# Patient Record
Sex: FEMALE | Race: BLACK OR AFRICAN AMERICAN | NOT HISPANIC OR LATINO | Employment: FULL TIME | URBAN - METROPOLITAN AREA
[De-identification: names, ages, dates, MRNs, and addresses within clinical notes are randomized per-mention and may not be internally consistent; named-entity substitution may affect disease eponyms.]

---

## 2017-06-29 ENCOUNTER — HISTORICAL (OUTPATIENT)
Dept: ADMINISTRATIVE | Facility: HOSPITAL | Age: 47
End: 2017-06-29

## 2018-08-08 ENCOUNTER — HISTORICAL (OUTPATIENT)
Dept: ADMINISTRATIVE | Facility: HOSPITAL | Age: 48
End: 2018-08-08

## 2018-08-08 LAB
ABS NEUT (OLG): 3.49 X10(3)/MCL (ref 2.1–9.2)
ALBUMIN SERPL-MCNC: 3.8 GM/DL (ref 3.4–5)
ALBUMIN/GLOB SERPL: 1 RATIO (ref 1–2)
ALP SERPL-CCNC: 81 UNIT/L (ref 45–117)
ALT SERPL-CCNC: 21 UNIT/L (ref 12–78)
APPEARANCE, UA: NORMAL
AST SERPL-CCNC: 16 UNIT/L (ref 15–37)
BACTERIA #/AREA URNS AUTO: ABNORMAL /[HPF]
BASOPHILS # BLD AUTO: 0.04 X10(3)/MCL
BASOPHILS NFR BLD AUTO: 1 %
BILIRUB SERPL-MCNC: 0.2 MG/DL (ref 0.2–1)
BILIRUB UR QL STRIP: NEGATIVE
BILIRUBIN DIRECT+TOT PNL SERPL-MCNC: <0.1 MG/DL
BILIRUBIN DIRECT+TOT PNL SERPL-MCNC: ABNORMAL MG/DL
BUN SERPL-MCNC: 9 MG/DL (ref 7–18)
CALCIUM SERPL-MCNC: 8.4 MG/DL (ref 8.5–10.1)
CHLORIDE SERPL-SCNC: 108 MMOL/L (ref 98–107)
CHOLEST SERPL-MCNC: 157 MG/DL
CHOLEST/HDLC SERPL: 3.6 {RATIO} (ref 0–4.4)
CO2 SERPL-SCNC: 27 MMOL/L (ref 21–32)
COLOR UR: NORMAL
CREAT SERPL-MCNC: 0.8 MG/DL (ref 0.6–1.3)
EOSINOPHIL # BLD AUTO: 0.1 10*3/UL
EOSINOPHIL NFR BLD AUTO: 1 %
ERYTHROCYTE [DISTWIDTH] IN BLOOD BY AUTOMATED COUNT: 13.9 % (ref 11.5–14.5)
EST. AVERAGE GLUCOSE BLD GHB EST-MCNC: 117 MG/DL
GLOBULIN SER-MCNC: 4.3 GM/ML (ref 2.3–3.5)
GLUCOSE (UA): NORMAL
GLUCOSE SERPL-MCNC: 89 MG/DL (ref 74–106)
HBA1C MFR BLD: 5.7 % (ref 4.2–6.3)
HCT VFR BLD AUTO: 41 % (ref 35–46)
HDLC SERPL-MCNC: 44 MG/DL
HGB BLD-MCNC: 13.3 GM/DL (ref 12–16)
HGB UR QL STRIP: NEGATIVE
HYALINE CASTS #/AREA URNS LPF: ABNORMAL /[LPF]
IMM GRANULOCYTES # BLD AUTO: 0.01 10*3/UL
IMM GRANULOCYTES NFR BLD AUTO: 0 %
KETONES UR QL STRIP: NEGATIVE
LDLC SERPL CALC-MCNC: 90 MG/DL (ref 0–130)
LEUKOCYTE ESTERASE UR QL STRIP: NEGATIVE
LYMPHOCYTES # BLD AUTO: 3.17 X10(3)/MCL
LYMPHOCYTES NFR BLD AUTO: 44 % (ref 13–40)
MCH RBC QN AUTO: 28.1 PG (ref 26–34)
MCHC RBC AUTO-ENTMCNC: 32.4 GM/DL (ref 31–37)
MCV RBC AUTO: 86.5 FL (ref 80–100)
MONOCYTES # BLD AUTO: 0.44 X10(3)/MCL
MONOCYTES NFR BLD AUTO: 6 % (ref 4–12)
NEUTROPHILS # BLD AUTO: 3.49 X10(3)/MCL
NEUTROPHILS NFR BLD AUTO: 48 X10(3)/MCL
NITRITE UR QL STRIP: NEGATIVE
PH UR STRIP: 8 [PH] (ref 4.5–8)
PLATELET # BLD AUTO: 302 X10(3)/MCL (ref 130–400)
PMV BLD AUTO: 10.2 FL (ref 7.4–10.4)
POTASSIUM SERPL-SCNC: 3.7 MMOL/L (ref 3.5–5.1)
PROT SERPL-MCNC: 8.1 GM/DL (ref 6.4–8.2)
PROT UR QL STRIP: NEGATIVE
RBC # BLD AUTO: 4.74 X10(6)/MCL (ref 4–5.2)
RBC #/AREA URNS AUTO: ABNORMAL /[HPF]
SODIUM SERPL-SCNC: 142 MMOL/L (ref 136–145)
SP GR UR STRIP: 1.01 (ref 1–1.03)
SQUAMOUS #/AREA URNS LPF: ABNORMAL /[LPF]
TRIGL SERPL-MCNC: 117 MG/DL
UROBILINOGEN UR STRIP-ACNC: NORMAL
VLDLC SERPL CALC-MCNC: 23 MG/DL
WBC # SPEC AUTO: 7.2 X10(3)/MCL (ref 4.5–11)
WBC #/AREA URNS AUTO: ABNORMAL /HPF

## 2019-02-15 ENCOUNTER — HISTORICAL (OUTPATIENT)
Dept: ADMINISTRATIVE | Facility: HOSPITAL | Age: 49
End: 2019-02-15

## 2019-08-15 ENCOUNTER — HISTORICAL (OUTPATIENT)
Dept: INTERNAL MEDICINE | Facility: CLINIC | Age: 49
End: 2019-08-15

## 2020-10-30 ENCOUNTER — HISTORICAL (OUTPATIENT)
Dept: ADMINISTRATIVE | Facility: HOSPITAL | Age: 50
End: 2020-10-30

## 2020-12-02 ENCOUNTER — HISTORICAL (OUTPATIENT)
Dept: ADMINISTRATIVE | Facility: HOSPITAL | Age: 50
End: 2020-12-02

## 2021-05-26 ENCOUNTER — HISTORICAL (OUTPATIENT)
Dept: INTERNAL MEDICINE | Facility: CLINIC | Age: 51
End: 2021-05-26

## 2021-06-10 ENCOUNTER — HISTORICAL (OUTPATIENT)
Dept: INTERNAL MEDICINE | Facility: CLINIC | Age: 51
End: 2021-06-10

## 2021-09-28 ENCOUNTER — HISTORICAL (OUTPATIENT)
Dept: ADMINISTRATIVE | Facility: HOSPITAL | Age: 51
End: 2021-09-28

## 2021-09-28 LAB
HBV SURFACE AG SERPL QL IA: NONREACTIVE
HCV AB SERPL QL IA: NONREACTIVE
HIV 1+2 AB+HIV1 P24 AG SERPL QL IA: NONREACTIVE
T PALLIDUM AB SER QL: NONREACTIVE

## 2021-11-08 ENCOUNTER — HISTORICAL (OUTPATIENT)
Dept: ADMINISTRATIVE | Facility: HOSPITAL | Age: 51
End: 2021-11-08

## 2021-11-08 LAB
BUN SERPL-MCNC: 10.6 MG/DL (ref 9.8–20.1)
CALCIUM SERPL-MCNC: 10.1 MG/DL (ref 8.7–10.5)
CHLORIDE SERPL-SCNC: 103 MMOL/L (ref 98–107)
CO2 SERPL-SCNC: 30 MMOL/L (ref 22–29)
CREAT SERPL-MCNC: 0.87 MG/DL (ref 0.55–1.02)
CREAT/UREA NIT SERPL: 12
DEPRECATED CALCIDIOL+CALCIFEROL SERPL-MC: 46.2 NG/ML (ref 30–80)
EST CREAT CLEARANCE SER (OHS): 71.2 ML/MIN
GLUCOSE SERPL-MCNC: 100 MG/DL (ref 74–100)
POTASSIUM SERPL-SCNC: 3.2 MMOL/L (ref 3.5–5.1)
PTH-INTACT SERPL-MCNC: 45.5 PG/ML (ref 8.7–77)
SODIUM SERPL-SCNC: 142 MMOL/L (ref 136–145)

## 2021-12-03 ENCOUNTER — HISTORICAL (OUTPATIENT)
Dept: ADMINISTRATIVE | Facility: HOSPITAL | Age: 51
End: 2021-12-03

## 2022-04-07 ENCOUNTER — HISTORICAL (OUTPATIENT)
Dept: ADMINISTRATIVE | Facility: HOSPITAL | Age: 52
End: 2022-04-07
Payer: MEDICAID

## 2022-04-23 VITALS
SYSTOLIC BLOOD PRESSURE: 125 MMHG | DIASTOLIC BLOOD PRESSURE: 75 MMHG | OXYGEN SATURATION: 100 % | WEIGHT: 239.63 LBS | BODY MASS INDEX: 38.51 KG/M2 | HEIGHT: 66 IN

## 2022-04-29 DIAGNOSIS — N18.9 CHRONIC KIDNEY DISEASE, UNSPECIFIED CKD STAGE: Primary | ICD-10-CM

## 2022-05-01 NOTE — HISTORICAL [facility] CERNER
This is a historical note converted from Cleo. Formatting and pictures may have been removed.  Please reference Cleo for original formatting and attached multimedia. Chief Complaint  follow up , c/o alethea calf/ankle soreness since walking time one month off/on  History of Present Illness  51-year-old -American female with a past medical history of hypertension, hypokalemia and?hypovitaminosis D who presents to clinic for follow-up visit. ?Patient has been doing well and having?only a few issues. ?Patient has been having calf cramps x1 month?that last 1-2 minutes in the morning?they go away?without?intervention.? She has not been having any?pain with walking. ?Nor?has she noted?any?numbness/tingling in her feet or?suddenly cold feet.? Patient has also been taking Estroven?that she received from [facility] recommendations?for her hot flashes.? Otherwise she has been doing well and having no issues.? Patient denies any recent fevers, chills, night sweats, chest pain, illnesses, shortness of breath, dizziness, headache no nausea, vomiting or urinary/bowel issues.  ?   Calf pain for about a month  Hot flashes - taking Estroven; based on [facility] pharmacy recommendations. It works.  Advised to discontinue?meloxicam?secondary to renal issues  ?   -Son at [place]  Review of Systems  14 point review of systems negative except for HPI  Physical Exam  Vitals & Measurements  T:?36.8? ?C (Oral)? HR:?89(Peripheral)? RR:?18? BP:?125/75?  HT:?168.00?cm? WT:?108.700?kg? BMI:?38.51? LMP:?01/01/2021 00:00 CST?  General:?well-developed well-nourished in no acute distress  Eye: PERRLA, EOMI, clear conjunctiva, eyelids normal  HENT:?Head-normocephalic?and atraumatic  Neck: full range of motion, no thyromegaly or lymphadenopathy, trachea midline, supple, no palpable thyroid nodules  Respiratory:?clear to auscultation bilaterally?without wheezes, rales, rhonchi  Cardiovascular:?regular rate and NSR without murmurs.  Nondisplaced?PMI. ?No gallops or rubs?no JVD. ?Capillary refill within normal limits. Stemmer sign negative.  Gastrointestinal:?soft, non-tender, non-distended with normal bowel sounds, without masses to palpation  Genitourinary: no CVA tenderness to palpation  Musculoskeletal:?full range of motion of all extremities/spine without limitation or discomfort  Integumentary: no rashes or skin lesions present  Neurologic: no signs of peripheral neurological deficit, motor/sensory function intact  Psychiatric: ?alert and oriented, cognitive function intact, cooperative with exam, good eye contact, judgement and insight intact, mood and affect full range.  Assessment/Plan  Hypertension  Hypokalemia  -Well-controlled;?BP?  -Patient stated that she was previously on lisinopril which gave her a terrible headache; will avoid  -Continue Amlodipine 10mg daily and HCTZ-Triamterene 25-37.5 daily and self-prescribed apple-cider vinegar gummies  -Continue Potasium chloride 10mEq daily  -We will follow-up with?BP check in 2 weeks  ?   CKD stage 1  -Taking meloxicam chronically; asked to discontinue all NSAIDs  -Continue HTN regimen  -Will monitor  ?   Calf cramps  -Likely related to hypokalemia  -Ordered BMP today; will increase K supplement as needed  ?   Hypovitaminosis D  -Patient states that she has been taking her medication?and is doing well  -Vitamin D level within normal limits  -Continue cholecalciferol?daily  ?   Health Maintenance/ Wellness  Pneumococcal vaccine: Not applicable at this time  TDAP: Up-to-date (August 2018)  Influenza vaccine: Flu vaccine (Guillbeaus 2021)  Zoster/Shingrix vaccine:?Not applicable at this time  Screening colonoscopy:?FOBT negative; will order?colonoscopy/FOBT?at next visit  Pap smear: negative for malignancy; Gyn visit 9/28/21  Mammogram: No mammographic evidence of malignancy in either breast (12/2020); repeat in 1 to 2 years depending on patient choice (Radiology vs ACP guidelines)  ?    Counselling:  - Patient instructed to limit alcohol use  - Patient counselled on smoking cessation  - Relevant educational materials provided  ?   Labs ordered:?CMP, CBC, PTH  Imaging:?Transthoracic echocardiogram  Medications: reconciled, discussed and refills given.  RTC in?6 months   Problem List/Past Medical History  Ongoing  Muscle spasm of cervical muscle of neck  Obesity  Historical  HTN (hypertension)  Pregnant  Pregnant  Pregnant  Pregnant  Pregnant  Pregnant  Procedure/Surgical History  Tubal ligation (2000)   Medications  amLODIPine 10 mg oral tablet, 10 mg= 1 tab(s), Oral, Daily, 11 refills  baclofen 10 mg oral tablet, See Instructions  D 1000 intl units oral tablet, See Instructions  Flublok Quadrivalent [id number] intramuscular solution, 0.5 mL, IM, Once,? ?Not Taking, Completed Rx  hydrochlorothiazide-triamterene 25 mg-37.5 mg oral capsule, See Instructions, 11 refills  meloxicam 15 mg oral tablet, 15 mg= 1 tab(s), Oral, Daily, PRN, 3 refills,? ?Not Taking, Completed Rx  potassium chloride 10 mEq oral CAPSULE extended release, 10 mEq= 1 cap(s), Oral, Daily, 11 refills  Shingrix intramuscular injection, 0.5 mL, IM, Once,? ?Not Taking, Completed Rx  Allergies  No Known Allergies  Social History  Abuse/Neglect  No, No, Yes, 09/17/2019  Alcohol - Denies Alcohol Use, 06/30/2014  Never, 02/04/2019  Employment/School  Employed, Activity level: Occasional physical work. Highest education level: High school. Operates hazardous equipment: No. Workplace hazards: Heavy lifting/twisting, Loud noises., 02/28/2015  Exercise  Exercise type: Walking, jump rope., 09/17/2019  Home/Environment  Living situation: Home/Independent. Alcohol abuse in household: No. Substance abuse in household: No. Smoker in household: No. Injuries/Abuse/Neglect in household: No. Feels unsafe at home: Yes. Safe place to go: Yes. Family/Friends available for support: Yes. Concern for family members at home: No. Major illness in household: No.  Financial concerns: No., 02/28/2015  Nutrition/Health  Regular, Good, 09/17/2019  Other  Sexual  Sexually active: Yes. Sexually active at age 15 Years. Number of current partners 1. Number of lifetime partners 4. Sexual orientation: Heterosexual. Uses condoms: Yes. History of sexual abuse: No., 02/28/2015  Spiritual/Cultural  Taoist, 01/07/2020  Substance Use - Denies Substance Abuse, 06/30/2014  Never, 02/04/2019  Tobacco - Denies Tobacco Use, 06/30/2014  Never (less than 100 in lifetime), N/A, 09/15/2020  Family History  Cancer - unknown origin: Father.  Hypertension.: Mother.  Stroke: Mother.  Stroke: Sister.  Immunizations  Vaccine Date Status Comments   influenza virus vaccine, inactivated 10/13/2021 Recorded    COVID-19 MRNA, LNP-S, PF- Pfizer 04/06/2021 Recorded    COVID-19 MRNA, LNP-S, PF- Pfizer 03/17/2021 Recorded    zoster vaccine, inactivated 12/17/2020 Recorded [facility]  [12/28/2020] Recod scanned into chart   zoster vaccine, inactivated [facility]2020 [facility]  [11/2/2020] Immunization record scanned into chart.   influenza virus vaccine, inactivated 10/01/2020 Recorded    influenza virus vaccine, inactivated 09/18/2019 Recorded    tetanus/diphtheria/pertussis, acel(Tdap) 08/20/2018 Given    Health Maintenance  Health Maintenance  ???Pending?(in the next year)  ??? ??OverDue  ??? ? ? ?Alcohol Misuse Screening due??01/02/21??and every 1??year(s)  ??? ??Due In Future?  ??? ? ? ?Obesity Screening not due until??01/01/22??and every 1??year(s)  ??? ? ? ?Diabetes Screening not due until??05/26/22??and every 1??year(s)  ??? ? ? ?Hypertension Management-BMP not due until??05/26/22??and every 1??year(s)  ??? ? ? ?Colorectal Screening not due until??06/10/22??and every 1??year(s)  ???Satisfied?(in the past 1 year)  ??? ??Satisfied?  ??? ? ? ?ADL Screening on??11/08/21.??Satisfied by Radha Nagy LPN  ??? ? ? ?Blood Pressure Screening on??11/08/21.??Satisfied by Radha Nagy LPN  ??? ? ? ?Body  Mass Index Check on??11/08/21.??Satisfied by Radha Nagy LPN  ??? ? ? ?Breast Cancer Screening on??12/02/20.??Satisfied by Marie Tinoco  ??? ? ? ?Colorectal Screening on??06/10/21.??Satisfied by Nakia Diaz  ??? ? ? ?Depression Screening on??11/08/21.??Satisfied by Radah Nagy LPN  ??? ? ? ?Diabetes Screening on??05/26/21.??Satisfied by Jet Nunn Jr.  ??? ? ? ?Hypertension Management-Blood Pressure on??11/08/21.??Satisfied by Radha Nagy LPN  ??? ? ? ?Influenza Vaccine on??10/13/21.??Satisfied by Radha Nagy LPN  ??? ? ? ?Obesity Screening on??11/08/21.??Satisfied by Radha Nagy LPN  ?  Lab Results  Reviewed in EMR  Diagnostic Results  Reviewed in EMR      Patient seen and examined,?reviewed with the resident,?agree with?assessment?and?plan.

## 2022-05-01 NOTE — HISTORICAL [facility] CERNER
This is a historical note converted from Cleo. Formatting and pictures may have been removed.  Please reference [facility] for original formatting and attached multimedia. Chief Complaint  annual  History of Present Illness  Pt is  here for annual gyn exam. Denies hx of abnormal pap.Last pap =NIL; HPV negative. . States is now having periods every?6-7 months. Denies abnormal bleeding or discharge. Denies hx of STIs. Pt had TL for contraception. Denies fly hx of breast, ovarian, or uterine cancer. Pt is followed in medicine clinic for primary care. Today, her only complaint is hot flashes. States recently began otc supplement (she is uncertain of the name) and they are currently tolerable.  Review of Systems  Negative except HPI  Physical Exam  Vitals & Measurements  T:?36.7? ?C (Oral)? HR:?85(Peripheral)? RR:?18? BP:?129/85? SpO2:?100%?  HT:?168.00?cm? WT:?108.700?kg? BMI:?38.51? LMP:?2021 00:00 CDT?  General: Alert and oriented, No acute distress.  Breast: No mass, No tenderness, No discharge.  Gastrointestinal: Soft, Non-tender.  Gynecology:  Labia: Bilateral, Majora, Minora, Within normal limits.  Vagina: Within normal limits.  Cervix: No cervical motion tenderness, No lesions.  Uterus: Mobile, Not tender.8 weeks  Ovaries: Not tender, No mass.  Integumentary: Warm, Dry.  Neurologic: Alert, Oriented.  Psychiatric: Cooperative, Appropriate mood & affect.  Assessment/Plan  Assessment/Plan  1.?Visit for routine gyn exam?Z01.419  ?pelvic; pap utd per acog guidelines  ?f/u with pcp for primary care  Ordered:  Clinic Follow up, *Est. 22 3:00:00 CDT, Order for future visit, Visit for routine gyn exam  Preventative Health Care Est 40-64 years [id number] PC, Visit for routine gyn exam  Visit for screening mammogram  Routine screening for STI (sexually transmitted infection)  Hot flashes, 21 13:38:00 CDT  ?  2.?Visit for screening mammogram?Z12.31  Ordered:  [facility] Screening Bilateral, Routine, *Est.  12/28/21 3:00:00 CST, Screening, None, Ambulatory, Rad Type, Order for future visit, Visit for screening mammogram, Schedule this test, [facility], *Est. 12/28/21 3:00:00 CST  Preventative Health Care Est 40-64 years [id number] PC, Visit for routine gyn exam  Visit for screening mammogram  Routine screening for STI (sexually transmitted infection)  Hot flashes, 09/28/21 13:38:00 CDT  ?  3.?Routine screening for STI (sexually transmitted infection)?Z11.3  Ordered:  Chlam trachom & N gonorrhoeae by [facility] [id number], Routine collect, Cervical, Order for future visit, 09/28/21 13:38:00 CDT, Stop date 09/28/21 13:38:00 CDT, Nurse collect, Routine screening for STI (sexually transmitted infection), 09/28/21 13:38:00 CDT  Hepatitis B Surface Antigen, Routine collect, 09/28/21 13:38:00 CDT, Blood, Order for future visit, Stop date 09/28/21 13:38:00 CDT, Lab Collect, Routine screening for STI (sexually transmitted infection), 09/28/21 13:38:00 CDT  Hepatitis C Antibody, Routine collect, 09/28/21 13:38:00 CDT, Blood, Order for future visit, Stop date 09/28/21 13:38:00 CDT, Lab Collect, Routine screening for STI (sexually transmitted infection), 09/28/21 13:38:00 CDT  HIV 1 and 2, Now collect, 09/28/21 13:38:00 CDT, Blood, Order for future visit, Stop date 09/28/21 13:38:00 CDT, Lab Collect, Routine screening for STI (sexually transmitted infection), 09/28/21 13:38:00 CDT  Preventative Health Care Est 40-64 years 23082 PC, Visit for routine gyn exam  Visit for screening mammogram  Routine screening for STI (sexually transmitted infection)  Hot flashes, 09/28/21 13:38:00 CDT  Syphilis Antibody (with Reflex RPR), Routine collect, 09/28/21 13:38:00 CDT, Blood, Order for future visit, Stop date 09/28/21 13:38:00 CDT, Lab Collect, Routine screening for STI (sexually transmitted infection), 09/28/21 13:38:00 CDT  Wet Prep Smear, Routine collect, Vaginal, Order for future visit, 09/28/21 13:38:00 CDT, Stop  date 09/28/21 13:38:00 CDT, Nurse collect, Routine screening for STI (sexually transmitted infection), 09/28/21 13:38:00 CDT  ?  4.?Hot flashes?R23.2  ?currently tolerable. Contact clinic for any worsening symptoms.? Will avoid HRT secondary to uncontrolled BP. Will consider venlafaxine if needed  Ordered:  [facility] Health Care Est 40-64 years [id number] PC, Visit for routine gyn exam  Visit for screening mammogram  Routine screening for STI (sexually transmitted infection)  Hot flashes, 09/28/21 13:38:00 CDT  ?  Referrals  Clinic Follow up, *Est. 09/28/22 3:00:00 CDT, Order for future visit, Visit for routine gyn exam   Problem List/Past Medical History  Ongoing  Muscle spasm of cervical muscle of neck  Obesity  Historical  HTN (hypertension)  Procedure/Surgical History  Tubal ligation (2000)   Medications  amLODIPine 10 mg oral tablet, 10 mg= 1 tab(s), Oral, Daily, 11 refills  baclofen 10 mg oral tablet, 10 mg= 1 tab(s), Oral, At Bedtime, 1 refills  D 1000 intl units oral tablet, See Instructions  hydrochlorothiazide-triamterene 25 mg-37.5 mg oral capsule, See Instructions, 11 refills  meloxicam 15 mg oral tablet, 15 mg= 1 tab(s), Oral, Daily, PRN, 3 refills  potassium chloride 10 mEq oral CAPSULE extended release, 10 mEq= 1 cap(s), Oral, Daily, 11 refills  Allergies  No Known Allergies  Social History  Abuse/Neglect  No, No, Yes, 09/17/2019  Alcohol - Denies Alcohol Use, 06/30/2014  Never, 02/04/2019  Employment/School  Employed, Activity level: Occasional physical work. Highest education level: High school. Operates hazardous equipment: No. Workplace hazards: Heavy lifting/twisting, Loud noises., 02/28/2015  Exercise  Exercise type: Walking, jump rope., 09/17/2019  Home/Environment  Living situation: Home/Independent. Alcohol abuse in household: No. Substance abuse in household: No. Smoker in household: No. Injuries/Abuse/Neglect in household: No. Feels unsafe at home: Yes. Safe place to go: Yes.  Family/Friends available for support: Yes. Concern for family members at home: No. Major illness in household: No. Financial concerns: No., 02/28/2015  Nutrition/Health  Regular, Good, 09/17/2019  Other  Sexual  Sexually active: Yes. Sexually active at age 15 Years. Number of current partners 1. Number of lifetime partners 4. Sexual orientation: Heterosexual. Uses condoms: Yes. History of sexual abuse: No., 02/28/2015  Spiritual/Cultural  [place], 01/07/2020  Substance Use - Denies Substance Abuse, 06/30/2014  Never, 02/04/2019  Tobacco - Denies Tobacco Use, 06/30/2014  Never (less than 100 in lifetime), N/A, 09/15/2020  Family History  Cancer - unknown origin: Father.  Hypertension.: Mother.  Stroke: Mother.  Stroke: Sister.  Immunizations  Vaccine Date Status Comments   zoster vaccine, inactivated 12/17/2020 Recorded [facility]  [12/28/2020] Recod scanned into chart   zoster vaccine, inactivated [facility]  [11/2/2020] Immunization record scanned into chart.   influenza virus vaccine, inactivated 10/01/2020 Recorded    influenza virus vaccine, inactivated 09/18/2019 Recorded    tetanus/diphtheria/pertussis, acel(Tdap) 08/20/2018 Given

## 2022-05-16 ENCOUNTER — LAB VISIT (OUTPATIENT)
Dept: LAB | Facility: HOSPITAL | Age: 52
End: 2022-05-16
Attending: STUDENT IN AN ORGANIZED HEALTH CARE EDUCATION/TRAINING PROGRAM
Payer: MEDICAID

## 2022-05-16 DIAGNOSIS — N18.9 CHRONIC KIDNEY DISEASE, UNSPECIFIED CKD STAGE: ICD-10-CM

## 2022-05-16 LAB
ALBUMIN SERPL-MCNC: 3.8 GM/DL (ref 3.5–5)
ALBUMIN/GLOB SERPL: 0.9 RATIO (ref 1.1–2)
ALP SERPL-CCNC: 93 UNIT/L (ref 40–150)
ALT SERPL-CCNC: 21 UNIT/L (ref 0–55)
AST SERPL-CCNC: 21 UNIT/L (ref 5–34)
BASOPHILS # BLD AUTO: 0.03 X10(3)/MCL (ref 0–0.2)
BASOPHILS NFR BLD AUTO: 0.4 %
BILIRUBIN DIRECT+TOT PNL SERPL-MCNC: 0.4 MG/DL
BUN SERPL-MCNC: 16.9 MG/DL (ref 9.8–20.1)
CALCIUM SERPL-MCNC: 9.6 MG/DL (ref 8.4–10.2)
CHLORIDE SERPL-SCNC: 103 MMOL/L (ref 98–107)
CHOLEST SERPL-MCNC: 156 MG/DL
CHOLEST/HDLC SERPL: 5 {RATIO} (ref 0–5)
CO2 SERPL-SCNC: 30 MMOL/L (ref 22–29)
CREAT SERPL-MCNC: 0.94 MG/DL (ref 0.55–1.02)
DEPRECATED CALCIDIOL+CALCIFEROL SERPL-MC: 40.2 NG/ML (ref 30–80)
EOSINOPHIL # BLD AUTO: 0.11 X10(3)/MCL (ref 0–0.9)
EOSINOPHIL NFR BLD AUTO: 1.5 %
ERYTHROCYTE [DISTWIDTH] IN BLOOD BY AUTOMATED COUNT: 14.1 % (ref 11.5–17)
EST. AVERAGE GLUCOSE BLD GHB EST-MCNC: 119.8 MG/DL
GLOBULIN SER-MCNC: 4.2 GM/DL (ref 2.4–3.5)
GLUCOSE SERPL-MCNC: 104 MG/DL (ref 74–100)
HBA1C MFR BLD: 5.8 %
HCT VFR BLD AUTO: 39.3 % (ref 37–47)
HDLC SERPL-MCNC: 34 MG/DL (ref 35–60)
HGB BLD-MCNC: 12.5 GM/DL (ref 12–16)
IMM GRANULOCYTES # BLD AUTO: 0.01 X10(3)/MCL (ref 0–0.02)
IMM GRANULOCYTES NFR BLD AUTO: 0.1 % (ref 0–0.43)
LDLC SERPL CALC-MCNC: 104 MG/DL (ref 50–140)
LYMPHOCYTES # BLD AUTO: 2.61 X10(3)/MCL (ref 0.6–4.6)
LYMPHOCYTES NFR BLD AUTO: 35.4 %
MCH RBC QN AUTO: 27.4 PG (ref 27–31)
MCHC RBC AUTO-ENTMCNC: 31.8 MG/DL (ref 33–36)
MCV RBC AUTO: 86 FL (ref 80–94)
MONOCYTES # BLD AUTO: 0.55 X10(3)/MCL (ref 0.1–1.3)
MONOCYTES NFR BLD AUTO: 7.5 %
NEUTROPHILS # BLD AUTO: 4.1 X10(3)/MCL (ref 2.1–9.2)
NEUTROPHILS NFR BLD AUTO: 55.1 %
NRBC BLD AUTO-RTO: 0 %
PLATELET # BLD AUTO: 289 X10(3)/MCL (ref 130–400)
PMV BLD AUTO: 10.1 FL (ref 9.4–12.4)
POTASSIUM SERPL-SCNC: 3.4 MMOL/L (ref 3.5–5.1)
PROT SERPL-MCNC: 8 GM/DL (ref 6.4–8.3)
RBC # BLD AUTO: 4.57 X10(6)/MCL (ref 4.2–5.4)
SODIUM SERPL-SCNC: 141 MMOL/L (ref 136–145)
TRIGL SERPL-MCNC: 90 MG/DL (ref 37–140)
TSH SERPL-ACNC: 0.74 UIU/ML (ref 0.35–4.94)
VLDLC SERPL CALC-MCNC: 18 MG/DL
WBC # SPEC AUTO: 7.4 X10(3)/MCL (ref 4.5–11.5)

## 2022-05-16 PROCEDURE — 83036 HEMOGLOBIN GLYCOSYLATED A1C: CPT

## 2022-05-16 PROCEDURE — 82306 VITAMIN D 25 HYDROXY: CPT

## 2022-05-16 PROCEDURE — 80061 LIPID PANEL: CPT

## 2022-05-16 PROCEDURE — 85025 COMPLETE CBC W/AUTO DIFF WBC: CPT

## 2022-05-16 PROCEDURE — 84443 ASSAY THYROID STIM HORMONE: CPT

## 2022-05-16 PROCEDURE — 36415 COLL VENOUS BLD VENIPUNCTURE: CPT

## 2022-05-16 PROCEDURE — 80053 COMPREHEN METABOLIC PANEL: CPT

## 2022-05-20 RX ORDER — BACLOFEN 10 MG/1
TABLET ORAL
COMMUNITY
Start: 2021-10-13 | End: 2023-11-09 | Stop reason: SDUPTHER

## 2022-05-20 RX ORDER — AMLODIPINE BESYLATE 10 MG/1
10 TABLET ORAL DAILY
COMMUNITY
Start: 2022-04-28 | End: 2022-09-29

## 2022-05-20 RX ORDER — POTASSIUM CHLORIDE 1125 MG/1
15 TABLET, EXTENDED RELEASE ORAL 2 TIMES DAILY
COMMUNITY
Start: 2022-04-30 | End: 2022-09-29

## 2022-05-20 RX ORDER — CHOLECALCIFEROL (VITAMIN D3) 25 MCG
1 TABLET ORAL DAILY
COMMUNITY
Start: 2021-11-12 | End: 2023-02-28 | Stop reason: SDUPTHER

## 2022-05-20 RX ORDER — TRIAMTERENE AND HYDROCHLOROTHIAZIDE 37.5; 25 MG/1; MG/1
1 CAPSULE ORAL DAILY
COMMUNITY
Start: 2022-04-20 | End: 2022-05-24 | Stop reason: SINTOL

## 2022-05-23 ENCOUNTER — OFFICE VISIT (OUTPATIENT)
Dept: INTERNAL MEDICINE | Facility: CLINIC | Age: 52
End: 2022-05-23
Payer: MEDICAID

## 2022-05-23 ENCOUNTER — LAB VISIT (OUTPATIENT)
Dept: LAB | Facility: HOSPITAL | Age: 52
End: 2022-05-23
Attending: STUDENT IN AN ORGANIZED HEALTH CARE EDUCATION/TRAINING PROGRAM
Payer: MEDICAID

## 2022-05-23 VITALS
TEMPERATURE: 98 F | WEIGHT: 242.31 LBS | DIASTOLIC BLOOD PRESSURE: 81 MMHG | BODY MASS INDEX: 38.94 KG/M2 | SYSTOLIC BLOOD PRESSURE: 118 MMHG | RESPIRATION RATE: 20 BRPM | HEIGHT: 66 IN | HEART RATE: 73 BPM

## 2022-05-23 DIAGNOSIS — E87.6 HYPOKALEMIA: ICD-10-CM

## 2022-05-23 DIAGNOSIS — N18.1 CKD (CHRONIC KIDNEY DISEASE) STAGE 1, GFR 90 ML/MIN OR GREATER: ICD-10-CM

## 2022-05-23 DIAGNOSIS — I10 HYPERTENSION, UNSPECIFIED TYPE: ICD-10-CM

## 2022-05-23 DIAGNOSIS — N18.1 CKD (CHRONIC KIDNEY DISEASE) STAGE 1, GFR 90 ML/MIN OR GREATER: Primary | ICD-10-CM

## 2022-05-23 LAB
ALBUMIN SERPL-MCNC: 3.7 GM/DL (ref 3.5–5)
ALBUMIN/GLOB SERPL: 0.9 RATIO (ref 1.1–2)
ALP SERPL-CCNC: 96 UNIT/L (ref 40–150)
ALT SERPL-CCNC: 15 UNIT/L (ref 0–55)
APPEARANCE UR: CLEAR
AST SERPL-CCNC: 19 UNIT/L (ref 5–34)
BACTERIA #/AREA URNS AUTO: ABNORMAL /HPF
BASOPHILS # BLD AUTO: 0.03 X10(3)/MCL (ref 0–0.2)
BASOPHILS NFR BLD AUTO: 0.5 %
BILIRUB UR QL STRIP.AUTO: NEGATIVE MG/DL
BILIRUBIN DIRECT+TOT PNL SERPL-MCNC: 0.2 MG/DL
BUN SERPL-MCNC: 13.6 MG/DL (ref 9.8–20.1)
CALCIUM SERPL-MCNC: 9.5 MG/DL (ref 8.4–10.2)
CHLORIDE SERPL-SCNC: 102 MMOL/L (ref 98–107)
CK SERPL-CCNC: 314 U/L (ref 29–168)
CO2 SERPL-SCNC: 34 MMOL/L (ref 22–29)
COLOR UR AUTO: ABNORMAL
CREAT SERPL-MCNC: 0.95 MG/DL (ref 0.55–1.02)
CRP SERPL-MCNC: 19.2 MG/L
EOSINOPHIL # BLD AUTO: 0.12 X10(3)/MCL (ref 0–0.9)
EOSINOPHIL NFR BLD AUTO: 1.9 %
ERYTHROCYTE [DISTWIDTH] IN BLOOD BY AUTOMATED COUNT: 13.9 % (ref 11.5–17)
ERYTHROCYTE [SEDIMENTATION RATE] IN BLOOD: 14 MM/HR (ref 0–20)
EST. AVERAGE GLUCOSE BLD GHB EST-MCNC: 122.6 MG/DL
GLOBULIN SER-MCNC: 4.1 GM/DL (ref 2.4–3.5)
GLUCOSE SERPL-MCNC: 85 MG/DL (ref 74–100)
GLUCOSE UR QL STRIP.AUTO: NORMAL MG/DL
HBA1C MFR BLD: 5.9 %
HCT VFR BLD AUTO: 38.9 % (ref 37–47)
HGB BLD-MCNC: 12.3 GM/DL (ref 12–16)
HYALINE CASTS #/AREA URNS LPF: ABNORMAL /LPF
IMM GRANULOCYTES # BLD AUTO: 0.01 X10(3)/MCL (ref 0–0.02)
IMM GRANULOCYTES NFR BLD AUTO: 0.2 % (ref 0–0.43)
KETONES UR QL STRIP.AUTO: NEGATIVE MG/DL
LEUKOCYTE ESTERASE UR QL STRIP.AUTO: NEGATIVE UNIT/L
LYMPHOCYTES # BLD AUTO: 2.35 X10(3)/MCL (ref 0.6–4.6)
LYMPHOCYTES NFR BLD AUTO: 37.8 %
MAGNESIUM SERPL-MCNC: 2.2 MG/DL (ref 1.6–2.6)
MCH RBC QN AUTO: 27 PG (ref 27–31)
MCHC RBC AUTO-ENTMCNC: 31.6 MG/DL (ref 33–36)
MCV RBC AUTO: 85.5 FL (ref 80–94)
MONOCYTES # BLD AUTO: 0.47 X10(3)/MCL (ref 0.1–1.3)
MONOCYTES NFR BLD AUTO: 7.6 %
MUCOUS THREADS URNS QL MICRO: ABNORMAL /LPF
NEUTROPHILS # BLD AUTO: 3.2 X10(3)/MCL (ref 2.1–9.2)
NEUTROPHILS NFR BLD AUTO: 52 %
NITRITE UR QL STRIP.AUTO: NEGATIVE
NRBC BLD AUTO-RTO: 0 %
PH UR STRIP.AUTO: 6 [PH]
PHOSPHATE SERPL-MCNC: 2.8 MG/DL (ref 2.3–4.7)
PLATELET # BLD AUTO: 291 X10(3)/MCL (ref 130–400)
PMV BLD AUTO: 10.3 FL (ref 9.4–12.4)
POTASSIUM SERPL-SCNC: 3.1 MMOL/L (ref 3.5–5.1)
PROT SERPL-MCNC: 7.8 GM/DL (ref 6.4–8.3)
PROT UR QL STRIP.AUTO: ABNORMAL MG/DL
RBC # BLD AUTO: 4.55 X10(6)/MCL (ref 4.2–5.4)
RBC #/AREA URNS AUTO: ABNORMAL /HPF
RBC UR QL AUTO: NEGATIVE UNIT/L
SODIUM SERPL-SCNC: 142 MMOL/L (ref 136–145)
SP GR UR STRIP.AUTO: 1.03
SQUAMOUS #/AREA URNS LPF: ABNORMAL /HPF
T4 FREE SERPL-MCNC: 0.94 NG/DL (ref 0.7–1.48)
TSH SERPL-ACNC: 0.73 UIU/ML (ref 0.35–4.94)
UROBILINOGEN UR STRIP-ACNC: NORMAL MG/DL
WBC # SPEC AUTO: 6.2 X10(3)/MCL (ref 4.5–11.5)
WBC #/AREA URNS AUTO: ABNORMAL /HPF

## 2022-05-23 PROCEDURE — 99213 OFFICE O/P EST LOW 20 MIN: CPT | Mod: PBBFAC | Performed by: INTERNAL MEDICINE

## 2022-05-23 PROCEDURE — 84443 ASSAY THYROID STIM HORMONE: CPT

## 2022-05-23 PROCEDURE — 84439 ASSAY OF FREE THYROXINE: CPT

## 2022-05-23 PROCEDURE — 80053 COMPREHEN METABOLIC PANEL: CPT

## 2022-05-23 PROCEDURE — 81001 URINALYSIS AUTO W/SCOPE: CPT

## 2022-05-23 PROCEDURE — 86140 C-REACTIVE PROTEIN: CPT

## 2022-05-23 PROCEDURE — 36415 COLL VENOUS BLD VENIPUNCTURE: CPT

## 2022-05-23 PROCEDURE — 83036 HEMOGLOBIN GLYCOSYLATED A1C: CPT

## 2022-05-23 PROCEDURE — 85025 COMPLETE CBC W/AUTO DIFF WBC: CPT

## 2022-05-23 PROCEDURE — 83735 ASSAY OF MAGNESIUM: CPT

## 2022-05-23 PROCEDURE — 84100 ASSAY OF PHOSPHORUS: CPT

## 2022-05-23 PROCEDURE — 85651 RBC SED RATE NONAUTOMATED: CPT

## 2022-05-23 PROCEDURE — 82550 ASSAY OF CK (CPK): CPT

## 2022-05-23 NOTE — PROGRESS NOTES
Noted patient's new labwork this afternoon. Elevated CPK of 314 and ESR of 14. With a normal ESR and a slightly elevated CPK I must considering Hypothyroid-induced myopathy vs statin induced myopathy. Patient's TSH and T4 WNL and patient is not on statin. Thus, we will continue to monitor at this time and continue potassium replacement (increased today to 20 mEq). Attending physician notified.

## 2022-05-23 NOTE — PROGRESS NOTES
"CC:  Muscle cramping in calves    HPI:  50yo AAF with a PMH as delineated below who presented to the IM clinic this morning for a follow up visit. She continues to have bilateral calf pain periodically with burning in her heals when on her feet. I explained to the patient that the periodic calf pain is likely related to her continued hypokalemia. She voiced understand and agreed as it has gotten better with the increase in potassium. The calf pain is likely related to patient's weight in combination with thin soles in her shoes. Patient voiced understanding and agreed. Patient will go to buy thicker soled shoes today and pledged to lose 42lbs over the next several months.    Review of Systems: 14 point review of systems negative except for HPI     Physical Exam   Vitals:    05/23/22 0758   BP: 118/81   BP Location: Left arm   Patient Position: Sitting   BP Method: X-Large (Automatic)   Pulse: 73   Resp: 20   Temp: 97.9 °F (36.6 °C)   TempSrc: Oral   Weight: 109.9 kg (242 lb 4.6 oz)   Height: 5' 6.14" (1.68 m)       General: well-developed well-nourished in no acute distress  Eye: PERRLA, EOMI, clear conjunctiva, eyelids normal  HENT: Head-normocephalic and atraumatic  Neck: full range of motion, no thyromegaly or lymphadenopathy, trachea midline, supple, no palpable thyroid nodules  Respiratory: clear to auscultation bilaterally without wheezes, rales, rhonchi  Cardiovascular: regular rate and NSR without murmurs. Nondisplaced PMI. No gallops or rubs no JVD. Capillary refill within normal limits. Stemmer sign negative.  Gastrointestinal: soft, non-tender, non-distended with normal bowel sounds, without masses to palpation  Genitourinary: no CVA tenderness to palpation  Musculoskeletal: full range of motion of all extremities/spine without limitation or discomfort  Integumentary: no rashes or skin lesions present  Neurologic: no signs of peripheral neurological deficit, motor/sensory function intact  Psychiatric: " alert and oriented, cognitive function intact, cooperative with exam, good eye contact, judgement and insight intact, mood and affect full range.      Assessment and plan:  Hypertension  Hypokalemia  -Well-controlled; BP   -Patient stated that she was previously on lisinopril which gave her a terrible headache; will avoid  -Continue Amlodipine 10mg daily and HCTZ-Triamterene 25-37.5 daily and self-prescribed apple-cider vinegar gummies  -Continue Potasium chloride 20 mEq daily  -We will follow-up with BP check in 2 weeks    CKD stage 1  -Taking meloxicam chronically; asked to discontinue all NSAIDs  -Continue HTN regimen  -Will monitor    Calf cramps  -Likely related to hypokalemia  -Ordered BMP today; will increase K supplement as needed    Hypovitaminosis D  -Patient states that she has been taking her medication and is doing well  -Vitamin D level within normal limits  -Continue cholecalciferol daily    Health Maintenance/ Wellness  Pneumococcal vaccine: Not applicable at this time  TDAP: Up-to-date (August 2018)  Influenza vaccine: Flu vaccine (Guillbeau's 2021)  Zoster/Shingrix vaccine: Not applicable at this time  Screening colonoscopy: FOBT negative; will order colonoscopy/FOBT at next visit  Pap smear: negative for malignancy; Gyn visit 9/28/21  Mammogram: No mammographic evidence of malignancy in either breast (12/2020); repeat in 1 to 2 years depending on patient choice (Radiology vs ACP guidelines)    Counselling:  - Patient instructed to limit alcohol use  - Patient counselled on smoking cessation  - Relevant educational materials provided    Labs ordered: CMP, CBC, PTH  Imaging: Transthoracic echocardiogram  Medications: reconciled, discussed and refills given.  RTC in 6 months

## 2022-05-24 ENCOUNTER — TELEPHONE (OUTPATIENT)
Dept: INTERNAL MEDICINE | Facility: CLINIC | Age: 52
End: 2022-05-24
Payer: MEDICAID

## 2022-05-24 NOTE — TELEPHONE ENCOUNTER
----- Message from Stanley Rene MD sent at 5/24/2022 10:42 AM CDT -----  Regarding: med change and nurse visit  Please call patient and inform her that we have discontinued her triamterene-HCTZ to just HCTZ 25mg daily. She will need a nurse visit in 2 weeks for BP check which I would appreciate help ordering. Please let me know if she has any questions.    Thanks,    DPLOUIS

## 2022-05-24 NOTE — TELEPHONE ENCOUNTER
Called Patient to inform her to stop taking the Triamterene. Patient voiced her understanding and said she will check with her pharamcy for the HCTZ. Patient has been scheduled for a BP Check on 6/7/2022 @ 8:30am.

## 2022-06-06 ENCOUNTER — TELEPHONE (OUTPATIENT)
Dept: INTERNAL MEDICINE | Facility: CLINIC | Age: 52
End: 2022-06-06
Payer: MEDICAID

## 2022-06-06 ENCOUNTER — TELEPHONE (OUTPATIENT)
Dept: INTERNAL MEDICINE | Facility: CLINIC | Age: 52
End: 2022-06-06

## 2022-06-06 RX ORDER — TRIAMTERENE AND HYDROCHLOROTHIAZIDE 37.5; 25 MG/1; MG/1
1 CAPSULE ORAL DAILY
COMMUNITY
Start: 2022-05-31 | End: 2022-09-29

## 2022-06-07 ENCOUNTER — CLINICAL SUPPORT (OUTPATIENT)
Dept: INTERNAL MEDICINE | Facility: CLINIC | Age: 52
End: 2022-06-07
Payer: MEDICAID

## 2022-06-07 VITALS
DIASTOLIC BLOOD PRESSURE: 80 MMHG | HEART RATE: 90 BPM | RESPIRATION RATE: 18 BRPM | WEIGHT: 243.63 LBS | HEIGHT: 66 IN | SYSTOLIC BLOOD PRESSURE: 117 MMHG | BODY MASS INDEX: 39.15 KG/M2 | TEMPERATURE: 98 F

## 2022-06-07 DIAGNOSIS — I10 HYPERTENSION, UNSPECIFIED TYPE: Primary | ICD-10-CM

## 2022-06-07 PROCEDURE — 99213 OFFICE O/P EST LOW 20 MIN: CPT | Mod: PBBFAC

## 2022-06-07 NOTE — PROGRESS NOTES
Here for BP Check per DR SHELBIE Rene    BP Readin/80    FL: 90    Last dose of Medications: Amlodipine 10 mg and Hydrochlorothiazide-triamterene  37.5-25 mg both taken 22 @   0800 am.      Patient told nurse she received a call from someone in clinic to stop taking hydrochlorothiazide-triamerene  37.5-25 mg .and to start taking hydrochlorothiazide 25 mg. She said she checked several times with her pharmacy and they told patient they never received a prescription for hydrochlorothiazide 25 mg one daily.   So patient has been taking the combo pill.     Complaints: none.   Provider sent  Blood Pressure reading.     Nurse phoned patient 's pharmacy and spoke with pharmacy personnel . [facility] pharmacy personnel told nurse they never received a prescription for hydrochlorothiazide 25 mg .                  Discharged home with instructions and information to continue with all prescribed medications,follow up appointments, drink plenty of water daily, maintain low sodium intake and to walk/ exercise daily.Patient voiced understanding of discharge instructions.

## 2022-06-08 DIAGNOSIS — I10 HYPERTENSION, UNSPECIFIED TYPE: Primary | ICD-10-CM

## 2022-06-08 NOTE — TELEPHONE ENCOUNTER
Called patient pharmacy regarding rx sent to [facility], spoke with pharmacy tech states that patient picked up the Triamterene-HCTZ on June 4th.

## 2022-06-13 NOTE — TELEPHONE ENCOUNTER
"Spoke with regarding HCTZ, patient states she just moved to a different location, unable to verify if she is just taking HCTZ, tried [facility],unsuccessful due to voicemail states "out to lunch,can call back after 2pm ,will call back after 2  "

## 2022-06-13 NOTE — TELEPHONE ENCOUNTER
Called [facility] pharmacy and spoke with tech, states patient was given the HCTZ-triamterene that was e-scribed according the her profile of medications, no new rx for just HCTZ was sent to pharmacy. Please review and advise

## 2022-06-16 ENCOUNTER — TELEPHONE (OUTPATIENT)
Dept: INTERNAL MEDICINE | Facility: CLINIC | Age: 52
End: 2022-06-16
Payer: MEDICAID

## 2022-06-16 RX ORDER — HYDROCHLOROTHIAZIDE 25 MG/1
25 TABLET ORAL DAILY
Qty: 30 TABLET | Refills: 11 | Status: SHIPPED | OUTPATIENT
Start: 2022-06-16 | End: 2023-05-23

## 2022-06-16 NOTE — TELEPHONE ENCOUNTER
Contacted patient ID and  verified. Patient informed HCTZ rx. Sent to pharmacy and to d/c taking HCTZ-Triametrene rx. Patient verbalized complete understanding.

## 2022-06-16 NOTE — TELEPHONE ENCOUNTER
----- Message from Stanley Rene MD sent at 6/16/2022  5:20 AM CDT -----  Please call patient and ensure she picks up HCTZ prescription and discontinues HCTZ-triamterene. Thank you!  -NANCY

## 2022-09-29 ENCOUNTER — OFFICE VISIT (OUTPATIENT)
Dept: GYNECOLOGY | Facility: CLINIC | Age: 52
End: 2022-09-29
Payer: MEDICAID

## 2022-09-29 VITALS
TEMPERATURE: 98 F | HEIGHT: 64 IN | HEART RATE: 102 BPM | BODY MASS INDEX: 41.83 KG/M2 | SYSTOLIC BLOOD PRESSURE: 124 MMHG | RESPIRATION RATE: 16 BRPM | OXYGEN SATURATION: 100 % | WEIGHT: 245 LBS | DIASTOLIC BLOOD PRESSURE: 85 MMHG

## 2022-09-29 DIAGNOSIS — Z01.419 WOMEN'S ANNUAL ROUTINE GYNECOLOGICAL EXAMINATION: Primary | ICD-10-CM

## 2022-09-29 DIAGNOSIS — Z12.31 SCREENING MAMMOGRAM FOR BREAST CANCER: ICD-10-CM

## 2022-09-29 PROCEDURE — 1160F RVW MEDS BY RX/DR IN RCRD: CPT | Mod: CPTII,,, | Performed by: NURSE PRACTITIONER

## 2022-09-29 PROCEDURE — 3074F SYST BP LT 130 MM HG: CPT | Mod: CPTII,,, | Performed by: NURSE PRACTITIONER

## 2022-09-29 PROCEDURE — 1159F MED LIST DOCD IN RCRD: CPT | Mod: CPTII,,, | Performed by: NURSE PRACTITIONER

## 2022-09-29 PROCEDURE — 3008F BODY MASS INDEX DOCD: CPT | Mod: CPTII,,, | Performed by: NURSE PRACTITIONER

## 2022-09-29 PROCEDURE — 3079F PR MOST RECENT DIASTOLIC BLOOD PRESSURE 80-89 MM HG: ICD-10-PCS | Mod: CPTII,,, | Performed by: NURSE PRACTITIONER

## 2022-09-29 PROCEDURE — 3074F PR MOST RECENT SYSTOLIC BLOOD PRESSURE < 130 MM HG: ICD-10-PCS | Mod: CPTII,,, | Performed by: NURSE PRACTITIONER

## 2022-09-29 PROCEDURE — 3008F PR BODY MASS INDEX (BMI) DOCUMENTED: ICD-10-PCS | Mod: CPTII,,, | Performed by: NURSE PRACTITIONER

## 2022-09-29 PROCEDURE — 87625 HPV TYPES 16 & 18 ONLY: CPT | Performed by: NURSE PRACTITIONER

## 2022-09-29 PROCEDURE — 1160F PR REVIEW ALL MEDS BY PRESCRIBER/CLIN PHARMACIST DOCUMENTED: ICD-10-PCS | Mod: CPTII,,, | Performed by: NURSE PRACTITIONER

## 2022-09-29 PROCEDURE — 99214 OFFICE O/P EST MOD 30 MIN: CPT | Mod: PBBFAC | Performed by: NURSE PRACTITIONER

## 2022-09-29 PROCEDURE — 3079F DIAST BP 80-89 MM HG: CPT | Mod: CPTII,,, | Performed by: NURSE PRACTITIONER

## 2022-09-29 PROCEDURE — 99396 PREV VISIT EST AGE 40-64: CPT | Mod: S$PBB,,, | Performed by: NURSE PRACTITIONER

## 2022-09-29 PROCEDURE — 1159F PR MEDICATION LIST DOCUMENTED IN MEDICAL RECORD: ICD-10-PCS | Mod: CPTII,,, | Performed by: NURSE PRACTITIONER

## 2022-09-29 PROCEDURE — 99396 PR PREVENTIVE VISIT,EST,40-64: ICD-10-PCS | Mod: S$PBB,,, | Performed by: NURSE PRACTITIONER

## 2022-09-29 NOTE — PROGRESS NOTES
"Patient ID: Vicky Pereira is a 52 y.o. female.    Chief Complaint: Well Woman      Review of patient's allergies indicates:  No Known Allergies      HPI:  Pt is  here for annual gyn exam. Denies hx of abnormal pap.Pt is perimenopausal. LMP 2022. Reports hot flashes. States uses otc medication that helps. She is unsure of the name.   Denies hx of STIs. Pt had TL for contraception. Denies fly hx of breast, ovarian, or uterine cancer. Pt is followed in medicine clinic for primary care.   Review of Systems:   Negative except for findings in HPI     Objective:   /85   Pulse 102   Temp 97.9 °F (36.6 °C)   Resp 16   Ht 5' 4" (1.626 m)   Wt 111.1 kg (245 lb)   SpO2 100%   BMI 42.05 kg/m²    Physical Exam:  GENERAL: Pt is aware and alert and  in no acute distress.  BREASTS: Bilateral-No masses, nipple discharge, skin changes, tenderness.  ABDOMEN: Soft, non tender.  VULVA:  No lesions or skin changes.  URETHRA: No lesions  BLADDER: No tenderness.  VAGINA: Mucosa normal,no discharge or lesions.  CERVIX:  no CMT, NO discharge; NO lesions  BIMANUAL EXAM:  The uterus is mobile, nontender, no palpable masses.(exam slightly limited secondary to body habitus) Obdulio adnexa reveal no evidence of masses; no fullness   SKIN: Warm and Dry  PSYCHIATRIC: Patient is awake and alert. Mood and affect are normal.    Assessment:   Women's annual routine gynecological examination  -     Liquid-Based Pap Smear, Screening Screening    Screening mammogram for breast cancer  -     Mammo Digital Screening Bilyeimy w/ Miles; Future; Expected date: 2023          1. Women's annual routine gynecological examination    2. Screening mammogram for breast cancer               Plan:       Follow up in about 1 year (around 2023).      "

## 2022-10-06 ENCOUNTER — TELEPHONE (OUTPATIENT)
Dept: GYNECOLOGY | Facility: CLINIC | Age: 52
End: 2022-10-06
Payer: MEDICAID

## 2022-10-06 LAB
HIGH RISK HPV 16 (PRECISION): NEGATIVE
HIGH RISK HPV 18/45 (PRECISION): NEGATIVE

## 2022-10-06 NOTE — TELEPHONE ENCOUNTER
Pap smear results showed unsatisfactory however received call from PAPs (Trisha) who stated pap smear results were actually NIL; HPV negative. She will correct the report and resend in an addendum.

## 2022-10-07 LAB
INSULIN SERPL-ACNC: NORMAL U[IU]/ML
LAB AP BETHESDA CATEGORY: NORMAL
LAB AP CLINICAL FINDINGS: NORMAL
LAB AP CONTRACEPTIVES: NORMAL
LAB AP GYN MOLECULAR TESTING: NORMAL
LAB AP LMP DATE: NORMAL
LAB AP OCHS PAP SPECIMEN ADEQUACY: NORMAL
LAB AP OHS PAP INTERPRETATION: NORMAL
LAB AP PAP DISCLAIMER COMMENTS: NORMAL
LAB AP PAP ESTROGEN REPLACEMENT THERAPY: NORMAL
LAB AP PAP PMP: NORMAL
LAB AP PAP PREVIOUS BX: NORMAL
LAB AP PAP PRIOR TREATMENT: NORMAL

## 2022-10-28 ENCOUNTER — HOSPITAL ENCOUNTER (EMERGENCY)
Facility: HOSPITAL | Age: 52
Discharge: HOME | End: 2022-10-28
Attending: FAMILY MEDICINE
Payer: MEDICAID

## 2022-10-28 VITALS
TEMPERATURE: 99 F | DIASTOLIC BLOOD PRESSURE: 91 MMHG | HEART RATE: 76 BPM | OXYGEN SATURATION: 100 % | RESPIRATION RATE: 16 BRPM | SYSTOLIC BLOOD PRESSURE: 145 MMHG | WEIGHT: 246.06 LBS | BODY MASS INDEX: 41 KG/M2 | HEIGHT: 65 IN

## 2022-10-28 DIAGNOSIS — R05.1 ACUTE COUGH: Primary | ICD-10-CM

## 2022-10-28 DIAGNOSIS — B96.89 BACTERIAL SINUSITIS: ICD-10-CM

## 2022-10-28 DIAGNOSIS — J32.9 BACTERIAL SINUSITIS: ICD-10-CM

## 2022-10-28 LAB
FLUAV AG UPPER RESP QL IA.RAPID: NOT DETECTED
FLUBV AG UPPER RESP QL IA.RAPID: NOT DETECTED
SARS-COV-2 RNA RESP QL NAA+PROBE: NOT DETECTED
STREP A PCR (OHS): NOT DETECTED

## 2022-10-28 PROCEDURE — 99284 EMERGENCY DEPT VISIT MOD MDM: CPT | Mod: 25

## 2022-10-28 PROCEDURE — 0241U COVID/FLU A&B PCR: CPT | Performed by: PHYSICIAN ASSISTANT

## 2022-10-28 PROCEDURE — 96372 THER/PROPH/DIAG INJ SC/IM: CPT | Performed by: PHYSICIAN ASSISTANT

## 2022-10-28 PROCEDURE — 63600175 PHARM REV CODE 636 W HCPCS: Performed by: PHYSICIAN ASSISTANT

## 2022-10-28 PROCEDURE — 87651 STREP A DNA AMP PROBE: CPT | Performed by: PHYSICIAN ASSISTANT

## 2022-10-28 RX ORDER — ALBUTEROL SULFATE 90 UG/1
1-2 AEROSOL, METERED RESPIRATORY (INHALATION) EVERY 4 HOURS PRN
Qty: 18 G | Refills: 0 | Status: SHIPPED | OUTPATIENT
Start: 2022-10-28 | End: 2022-10-28 | Stop reason: CLARIF

## 2022-10-28 RX ORDER — METHYLPREDNISOLONE SOD SUCC 125 MG
125 VIAL (EA) INJECTION
Status: COMPLETED | OUTPATIENT
Start: 2022-10-28 | End: 2022-10-28

## 2022-10-28 RX ORDER — AMOXICILLIN AND CLAVULANATE POTASSIUM 875; 125 MG/1; MG/1
1 TABLET, FILM COATED ORAL EVERY 12 HOURS
Qty: 14 TABLET | Refills: 0 | Status: SHIPPED | OUTPATIENT
Start: 2022-10-28 | End: 2022-11-04

## 2022-10-28 RX ORDER — CETIRIZINE HYDROCHLORIDE 10 MG/1
10 TABLET ORAL DAILY
Qty: 14 TABLET | Refills: 0 | Status: SHIPPED | OUTPATIENT
Start: 2022-10-28 | End: 2023-05-23 | Stop reason: ALTCHOICE

## 2022-10-28 RX ORDER — AZELASTINE 1 MG/ML
1 SPRAY, METERED NASAL 2 TIMES DAILY
Qty: 30 ML | Refills: 0 | Status: SHIPPED | OUTPATIENT
Start: 2022-10-28 | End: 2022-11-07

## 2022-10-28 RX ORDER — PREDNISONE 20 MG/1
20 TABLET ORAL DAILY
Qty: 4 TABLET | Refills: 0 | Status: SHIPPED | OUTPATIENT
Start: 2022-10-28 | End: 2022-10-28 | Stop reason: CLARIF

## 2022-10-28 RX ORDER — PROMETHAZINE HYDROCHLORIDE AND DEXTROMETHORPHAN HYDROBROMIDE 6.25; 15 MG/5ML; MG/5ML
5 SYRUP ORAL EVERY 6 HOURS PRN
Qty: 118 ML | Refills: 0 | Status: SHIPPED | OUTPATIENT
Start: 2022-10-28 | End: 2023-02-28

## 2022-10-28 RX ADMIN — METHYLPREDNISOLONE SODIUM SUCCINATE 125 MG: 125 INJECTION, POWDER, FOR SOLUTION INTRAMUSCULAR; INTRAVENOUS at 10:10

## 2022-10-29 NOTE — DISCHARGE INSTRUCTIONS
Stay well hydrated drinking plenty of water daily.  Take multi vitamin, Vit C and Zinc daily.  Return to ED with any concerning symptoms.   Alternate Tylenol and Ibuprofen for body aches and fever.  Follow up with pcp within 5-7 days.

## 2022-10-29 NOTE — ED PROVIDER NOTES
Encounter Date: 10/28/2022       History     Chief Complaint   Patient presents with    Cough     Pt c/o coughing x 3 days.      51 yo F w/ PMHx significant for HTN & obesity presents to ED c/o 3 day hx of cough, sore throat, body aches & fatigue. Patient reports having similar cough 1-2 weeks ago, but states it got better & then came back. Cough is dry, denies hemoptysis. Denies CP, diaphoresis, syncope, orthopnea, edema, F/C, congestion, rhinorrhea, wheezing. VSS on arrival w/ NAD.      Review of patient's allergies indicates:  No Known Allergies  Past Medical History:   Diagnosis Date    Bilateral calf pain     cramping calf pain for months    Essential (primary) hypertension     Hypokalemia      Past Surgical History:   Procedure Laterality Date    BILATERAL TUBAL LIGATION  04/15/2001     Family History   Problem Relation Age of Onset    Cancer Mother         brain tumor    Cancer Father         stomach cancer    Stroke Sister     Heart failure Brother     Stroke Brother      Social History     Tobacco Use    Smoking status: Never    Smokeless tobacco: Never   Substance Use Topics    Alcohol use: Never    Drug use: Never     Review of Systems   Constitutional:  Positive for fatigue. Negative for appetite change, chills, diaphoresis and fever.   HENT:  Negative for congestion, ear discharge, ear pain, postnasal drip, rhinorrhea, trouble swallowing and voice change.    Eyes:  Negative for visual disturbance.   Respiratory:  Positive for cough. Negative for shortness of breath, wheezing and stridor.    Cardiovascular:  Negative for chest pain, palpitations and leg swelling.   Gastrointestinal:  Negative for abdominal pain, diarrhea, nausea and vomiting.   Genitourinary:  Negative for dysuria and hematuria.   Musculoskeletal:  Positive for arthralgias and myalgias. Negative for back pain, joint swelling, neck pain and neck stiffness.   Skin:  Negative for pallor and rash.   Allergic/Immunologic: Negative for  immunocompromised state.   Neurological:  Negative for dizziness, syncope, light-headedness, numbness and headaches.   Hematological:  Negative for adenopathy.   Psychiatric/Behavioral:  Negative for confusion.    All other systems reviewed and are negative.    Physical Exam     Initial Vitals [10/28/22 2014]   BP Pulse Resp Temp SpO2   (!) 153/90 75 17 98.6 °F (37 °C) 100 %      MAP       --         Physical Exam    Nursing note and vitals reviewed.  Constitutional: She appears well-developed and well-nourished. She is not diaphoretic. No distress.   HENT:   Head: Normocephalic and atraumatic.   Right Ear: Hearing, tympanic membrane, external ear and ear canal normal.   Left Ear: Hearing, tympanic membrane, external ear and ear canal normal.   Nose: Nose normal. No mucosal edema or rhinorrhea. Right sinus exhibits no maxillary sinus tenderness and no frontal sinus tenderness. Left sinus exhibits no maxillary sinus tenderness and no frontal sinus tenderness.   Mouth/Throat: Uvula is midline and mucous membranes are normal. No trismus in the jaw. No dental abscesses. Posterior oropharyngeal erythema present. No oropharyngeal exudate, posterior oropharyngeal edema or tonsillar abscesses.   Eyes: Conjunctivae and EOM are normal. Pupils are equal, round, and reactive to light.   Neck: Neck supple. No tracheal deviation present. No JVD present.   Normal range of motion.  Cardiovascular:  Normal rate, regular rhythm, normal heart sounds and intact distal pulses.     Exam reveals no gallop and no friction rub.       No murmur heard.  Pulmonary/Chest: Breath sounds normal. No stridor. No respiratory distress. She has no wheezes. She has no rhonchi. She has no rales.   Abdominal: Abdomen is soft. Bowel sounds are normal. She exhibits no distension and no mass. There is no abdominal tenderness. There is no rebound and no guarding.   Musculoskeletal:         General: No tenderness or edema. Normal range of motion.       Cervical back: Normal range of motion and neck supple.     Lymphadenopathy:     She has no cervical adenopathy.   Neurological: She is alert and oriented to person, place, and time. She has normal strength. No cranial nerve deficit or sensory deficit.   Skin: Skin is warm and dry. Capillary refill takes less than 2 seconds. No rash noted. No erythema. No pallor.   Psychiatric: She has a normal mood and affect. Thought content normal.       ED Course   Procedures  Labs Reviewed   COVID/FLU A&B PCR - Normal    Narrative:     The Xpert Xpress SARS-CoV-2/FLU/RSV plus is a rapid, multiplexed real-time PCR test intended for the simultaneous qualitative detection and differentiation of SARS-CoV-2, Influenza A, Influenza B, and respiratory syncytial virus (RSV) viral RNA in either nasopharyngeal swab or nasal swab specimens.         STREP GROUP A BY PCR - Normal    Narrative:     The Xpert Xpress Strep A test is a rapid, qualitative in vitro diagnostic test for the detection of Streptococcus pyogenes (Group A ß-hemolytic Streptococcus, Strep A) in throat swab specimens from patients with signs and symptoms of pharyngitis.            Imaging Results              X-Ray Chest 1 View (In process)  Result time 10/28/22 21:43:45                     Medications   methylPREDNISolone sodium succinate injection 125 mg (125 mg Intramuscular Given 10/28/22 2217)     Medical Decision Making:   Clinical Tests:   Radiological Study: Reviewed  ED Management:  The patient is resting comfortably and in no acute distress.   I personally discussed her test results and treatment plan.  Gave strict ED precautions, discussed specific conditions for return to the emergency department and importance of follow up with her primary care provider.  Patient voices understanding and agrees to the plan discussed. All patients' questions have been answered at this time.   She has remained hemodynamically stable throughout entire stay in ED and is stable  for discharge home.             ED Course as of 10/29/22 0337   Fri Oct 28, 2022   2056 Patient care transitioned to Fidelina Borrero PA-C [NB]      ED Course User Index  [NB] CLAUDIA Mccabe                 Clinical Impression:   Final diagnoses:  [R05.1] Acute cough (Primary)  [J32.9, B96.89] Bacterial sinusitis      ED Disposition Condition    Discharge Stable          ED Prescriptions       Medication Sig Dispense Start Date End Date Auth. Provider    promethazine-dextromethorphan (PROMETHAZINE-DM) 6.25-15 mg/5 mL Syrp Take 5 mLs by mouth every 6 (six) hours as needed (cough). 118 mL 10/28/2022 -- CLAUDIA Mitchell    cetirizine (ZYRTEC) 10 MG tablet Take 1 tablet (10 mg total) by mouth once daily. for 14 days 14 tablet 10/28/2022 11/11/2022 CLAUDIA Mitchell    azelastine (ASTELIN) 137 mcg (0.1 %) nasal spray 1 spray (137 mcg total) by Nasal route 2 (two) times daily. for 7 days 30 mL 10/28/2022 11/4/2022 CLAUDIA Mitchell    amoxicillin-clavulanate 875-125mg (AUGMENTIN) 875-125 mg per tablet Take 1 tablet by mouth every 12 (twelve) hours. for 7 days 14 tablet 10/28/2022 11/4/2022 CLAUDIA Mitchell    albuterol (PROVENTIL/VENTOLIN HFA) 90 mcg/actuation inhaler  (Status: Discontinued) Inhale 1-2 puffs into the lungs every 4 (four) hours as needed for Wheezing. Rescue 18 g 10/28/2022 10/28/2022 CLAUDIA Mitchell    predniSONE (DELTASONE) 20 MG tablet  (Status: Discontinued) Take 1 tablet (20 mg total) by mouth once daily. for 4 days 4 tablet 10/28/2022 10/28/2022 CLAUDIA Mitchell          Follow-up Information       Follow up With Specialties Details Why Contact Info    [facility] Emergency Dept Emergency Medicine  As needed, If symptoms worsen [address] [facility] [id number] [phone]             CLAUDIA Mitchell  10/29/22 0337

## 2022-11-06 NOTE — PROGRESS NOTES
[facility] INTERNAL MEDICINE  OUTPATIENT OFFICE VISIT NOTE       Chief Complaint: Establish Care (Establish care with new PCP. ) and Rash (Patient states irritation between buttocks for about a week. No itching, when wiping patient states there is a burning sensation. Recently on antibiotics. )       HPI: Vicky Pereira is a 52 y.o. yo female with  has a past medical history of Bilateral calf pain, Essential (primary) hypertension, and Hypokalemia., who presents for establishment with myself. Patient was seen in the ED last week for bacterial sinusitis. She says she completed a course of antibiotics and does not have symptoms at this time. Patient does complain some burning in her anal region this visit when she wipes. She denies any new rash to the area and denies any pruritis. She said her daughter had looked at it and did not see any blood or rash. She denies any fevers, chills, chest pain, abdominal pain, dysuria, constipation or diarrhea. Plan to obtain labs in clinic today to reassess hypokalemia from last visit. Patient reports not taking her potassium supplements as her calve pain has subsided. She is due for flu vaccine which she is amenable to getting. She is also due for colon cancer screening and would like to get cologuard done. She will need to get mammogram in January 2023 which is already scheduled.       Past Medical History:   has a past medical history of Bilateral calf pain, Essential (primary) hypertension, and Hypokalemia.     Past Surgical History:   has a past surgical history that includes Bilateral tubal ligation (04/15/2001).     Family History:  family history includes Cancer in her father and mother; Heart failure in her brother; Stroke in her brother and sister.     Social History:   reports that she has never smoked. She has never used smokeless tobacco. She reports that she does not currently use alcohol. She reports that she does not use drugs.     Allergies:  has No Known Allergies.      Home Medications:  Prior to Admission medications    Medication Sig Start Date End Date Taking? Authorizing Provider   azelastine (ASTELIN) 137 mcg (0.1 %) nasal spray 1 spray (137 mcg total) by Nasal route 2 (two) times daily. for 7 days 10/28/22 11/4/22  CLAUDIA Mitchell   baclofen (LIORESAL) 10 MG tablet   See Instructions, TAKE 1 TABLET BY MOUTH AT BEDTIME, DONT MIX WITH ALCOHOL OR ANY OTHER SEDATING MEDS. DONT OPERATE HEAVY MACHINERY, # 30 tab(s), 3 Refill(s), Pharmacy: [id number], 168, cm, Height/Length Dosing, 11/08/21 8:02:00 CST, 108.7, kg, W... 10/13/21   Historical Provider   cetirizine (ZYRTEC) 10 MG tablet Take 1 tablet (10 mg total) by mouth once daily. for 14 days 10/28/22 11/11/22  CLAUDIA Mitchell   hydroCHLOROthiazide (HYDRODIURIL) 25 MG tablet Take 1 tablet (25 mg total) by mouth once daily. 6/16/22 6/16/23  Stanley Rene MD   promethazine-dextromethorphan (PROMETHAZINE-DM) 6.25-15 mg/5 mL Syrp Take 5 mLs by mouth every 6 (six) hours as needed (cough). 10/28/22   CLAUDIA Mitchell   vitamin D (VITAMIN D3) 1000 units Tab Take 1 tablet by mouth once daily. 11/12/21   Historical Provider       ROS:  Constitutional: no fever, fatigue, weakness  Eye: no vision loss, eye redness, drainage, or pain  ENMT: no sore throat, ear pain, sinus pain/congestion, nasal congestion/drainage  Respiratory: no cough, no wheezing, no shortness of breath  Cardiovascular: no chest pain, no palpitations, no edema  Gastrointestinal: no nausea, vomiting, or diarrhea. No abdominal pain  Genitourinary: no dysuria, no urinary frequency or urgency, no hematuria  Hema/Lymph: no abnormal bruising or bleeding  Endocrine: no heat or cold intolerance, no excessive thirst or excessive urination  Musculoskeletal: no muscle or joint pain, no joint swelling  Integumentary: burning with wiping anal region  Neurologic: no headache, no dizziness, no weakness or numbness    OBJECTIVE:     Vital signs:   /82 (BP Location:  "Right arm, Patient Position: Sitting, BP Method: Large (Automatic))   Pulse 100   Temp 97.7 °F (36.5 °C) (Oral)   Resp 18   Ht 5' 5" (1.651 m)   Wt 110.7 kg (244 lb)   BMI 40.60 kg/m²      Physical Examination:  Physical Exam  Constitutional:       General: She is not in acute distress.     Appearance: Normal appearance.   HENT:      Head: Normocephalic.      Right Ear: Tympanic membrane normal.      Left Ear: Tympanic membrane normal.      Mouth/Throat:      Mouth: Mucous membranes are dry.      Pharynx: Oropharynx is clear.   Eyes:      Conjunctiva/sclera: Conjunctivae normal.      Pupils: Pupils are equal, round, and reactive to light.   Cardiovascular:      Rate and Rhythm: Regular rhythm. Tachycardia present.      Pulses: Normal pulses.      Heart sounds: No murmur heard.  Pulmonary:      Effort: Pulmonary effort is normal. No respiratory distress.   Chest:      Chest wall: No tenderness.   Abdominal:      General: Abdomen is flat. Bowel sounds are normal. There is no distension.      Palpations: Abdomen is soft.      Tenderness: There is no abdominal tenderness.   Genitourinary:     Comments: Small anal tear without any blood   Musculoskeletal:         General: Normal range of motion.      Cervical back: Normal range of motion.   Skin:     General: Skin is warm.   Neurological:      General: No focal deficit present.      Mental Status: She is alert and oriented to person, place, and time.        Labs:  Lab Results   Component Value Date    WBC 6.2 05/23/2022    HGB 12.3 05/23/2022    HCT 38.9 05/23/2022     05/23/2022    MCV 85.5 05/23/2022    RDW 13.9 05/23/2022    Lab Results   Component Value Date     05/23/2022    K 3.1 (L) 05/23/2022    CO2 34 (H) 05/23/2022    BUN 13.6 05/23/2022    CREATININE 0.95 05/23/2022    CALCIUM 9.5 05/23/2022    MG 2.20 05/23/2022    PHOS 2.8 05/23/2022      Lab Results   Component Value Date    HGBA1C 5.9 05/23/2022    .6 05/23/2022    CREATININE 0.95 " 05/23/2022    Lab Results   Component Value Date    TSH 0.7265 05/23/2022    [id number] 0.94 05/23/2022                  ASSESSMENT & PLAN:     Hemorrhoids  Anal tear  -Reports burning with wiping  -No rash in area; small skin tear noted on exam  -Will prescribe hydrocortisone cream to be applied to area until it heals    Hypertension  Hypokalemia  -Well-controlled; /82 in clinic  -Patient stated that she was previously on lisinopril which gave her a terrible headache; will avoid  -Continue Amlodipine 10mg daily and HCTZ 25 mg  -Stopped taking potassium supplement  -Obtain CMP clinic lab to reassess    CKD stage 1  -Taking meloxicam chronically; asked to discontinue all NSAIDs  -Continue HTN regimen  -Will monitor    Calf cramps  -Likely related to hypokalemia  -Stopped taking potassium supplement  -Not bothering patient at this time  -Ordered CMP to reassess K level    Hypovitaminosis D  -Patient states that she has been taking her medication and is doing well  -Vitamin D level within normal limits  -Continue cholecalciferol daily    Healthcare maintenance  -Due for colon cancer screening today  -Will order cologuard  -Due for flu vaccine today  -Will administer today  -Due for mammogram in January 2023; already scheduled    Health Maintenance   Topic Date Due    Mammogram  12/03/2022    Lipid Panel  05/16/2027    TETANUS VACCINE  08/20/2028    Hepatitis C Screening  Completed        Health Maintenance/ Wellness  Pneumococcal vaccine: Not applicable at this time  TDAP: Up-to-date (August 2018)  Influenza vaccine: will administer today 11/22  Zoster/Shingrix vaccine: Not applicable at this time  Screening colonoscopy: Ordered cologuard today  Pap smear: Negative 9/22  Mammogram: No mammographic evidence of malignancy in either breast (12/2021); ordered for 1/23      Labs ordered: CBC, CMP, CK  Imaging: N/A  Medications: reconciled, discussed and refills given.  RTC in 3 months      Miguel Russell,  MD  [facility], -

## 2022-11-07 ENCOUNTER — OFFICE VISIT (OUTPATIENT)
Dept: INTERNAL MEDICINE | Facility: CLINIC | Age: 52
End: 2022-11-07
Payer: MEDICAID

## 2022-11-07 VITALS
BODY MASS INDEX: 40.65 KG/M2 | TEMPERATURE: 98 F | SYSTOLIC BLOOD PRESSURE: 116 MMHG | DIASTOLIC BLOOD PRESSURE: 82 MMHG | RESPIRATION RATE: 18 BRPM | HEART RATE: 100 BPM | WEIGHT: 244 LBS | HEIGHT: 65 IN

## 2022-11-07 DIAGNOSIS — Z23 NEED FOR INFLUENZA VACCINATION: Primary | ICD-10-CM

## 2022-11-07 DIAGNOSIS — I10 HYPERTENSION, UNSPECIFIED TYPE: ICD-10-CM

## 2022-11-07 DIAGNOSIS — S31.831A TEAR OF ANAL SKIN, INITIAL ENCOUNTER: ICD-10-CM

## 2022-11-07 DIAGNOSIS — E55.9 HYPOVITAMINOSIS D: ICD-10-CM

## 2022-11-07 DIAGNOSIS — Z12.11 COLON CANCER SCREENING: ICD-10-CM

## 2022-11-07 DIAGNOSIS — E87.6 HYPOKALEMIA: ICD-10-CM

## 2022-11-07 LAB
ALBUMIN SERPL-MCNC: 3.7 GM/DL (ref 3.5–5)
ALBUMIN/GLOB SERPL: 0.9 RATIO (ref 1.1–2)
ALP SERPL-CCNC: 97 UNIT/L (ref 40–150)
ALT SERPL-CCNC: 18 UNIT/L (ref 0–55)
AST SERPL-CCNC: 17 UNIT/L (ref 5–34)
BASOPHILS # BLD AUTO: 0.04 X10(3)/MCL (ref 0–0.2)
BASOPHILS NFR BLD AUTO: 0.7 %
BILIRUBIN DIRECT+TOT PNL SERPL-MCNC: 0.2 MG/DL
BUN SERPL-MCNC: 14 MG/DL (ref 9.8–20.1)
CALCIUM SERPL-MCNC: 9.7 MG/DL (ref 8.4–10.2)
CHLORIDE SERPL-SCNC: 103 MMOL/L (ref 98–107)
CK SERPL-CCNC: 134 U/L (ref 29–168)
CO2 SERPL-SCNC: 31 MMOL/L (ref 22–29)
CREAT SERPL-MCNC: 0.88 MG/DL (ref 0.55–1.02)
EOSINOPHIL # BLD AUTO: 0.09 X10(3)/MCL (ref 0–0.9)
EOSINOPHIL NFR BLD AUTO: 1.5 %
ERYTHROCYTE [DISTWIDTH] IN BLOOD BY AUTOMATED COUNT: 14.2 % (ref 11.5–17)
GFR SERPLBLD CREATININE-BSD FMLA CKD-EPI: >60 MLS/MIN/1.73/M2
GLOBULIN SER-MCNC: 4.1 GM/DL (ref 2.4–3.5)
GLUCOSE SERPL-MCNC: 107 MG/DL (ref 74–100)
HCT VFR BLD AUTO: 40.6 % (ref 37–47)
HGB BLD-MCNC: 12.8 GM/DL (ref 12–16)
IMM GRANULOCYTES # BLD AUTO: 0.02 X10(3)/MCL (ref 0–0.04)
IMM GRANULOCYTES NFR BLD AUTO: 0.3 %
LYMPHOCYTES # BLD AUTO: 2.58 X10(3)/MCL (ref 0.6–4.6)
LYMPHOCYTES NFR BLD AUTO: 43.5 %
MCH RBC QN AUTO: 27.3 PG (ref 27–31)
MCHC RBC AUTO-ENTMCNC: 31.5 MG/DL (ref 33–36)
MCV RBC AUTO: 86.6 FL (ref 80–94)
MONOCYTES # BLD AUTO: 0.52 X10(3)/MCL (ref 0.1–1.3)
MONOCYTES NFR BLD AUTO: 8.8 %
NEUTROPHILS # BLD AUTO: 2.7 X10(3)/MCL (ref 2.1–9.2)
NEUTROPHILS NFR BLD AUTO: 45.2 %
NRBC BLD AUTO-RTO: 0 %
PLATELET # BLD AUTO: 261 X10(3)/MCL (ref 130–400)
PMV BLD AUTO: 10.2 FL (ref 7.4–10.4)
POTASSIUM SERPL-SCNC: 3.6 MMOL/L (ref 3.5–5.1)
PROT SERPL-MCNC: 7.8 GM/DL (ref 6.4–8.3)
RBC # BLD AUTO: 4.69 X10(6)/MCL (ref 4.2–5.4)
SODIUM SERPL-SCNC: 141 MMOL/L (ref 136–145)
WBC # SPEC AUTO: 5.9 X10(3)/MCL (ref 4.5–11.5)

## 2022-11-07 PROCEDURE — 80053 COMPREHEN METABOLIC PANEL: CPT

## 2022-11-07 PROCEDURE — 99213 OFFICE O/P EST LOW 20 MIN: CPT | Mod: PBBFAC

## 2022-11-07 PROCEDURE — 36415 COLL VENOUS BLD VENIPUNCTURE: CPT

## 2022-11-07 PROCEDURE — 82550 ASSAY OF CK (CPK): CPT

## 2022-11-07 PROCEDURE — 90471 IMMUNIZATION ADMIN: CPT | Mod: PBBFAC

## 2022-11-07 PROCEDURE — 90686 IIV4 VACC NO PRSV 0.5 ML IM: CPT | Mod: PBBFAC

## 2022-11-07 PROCEDURE — 85025 COMPLETE CBC W/AUTO DIFF WBC: CPT

## 2022-11-07 RX ORDER — POTASSIUM CHLORIDE 1125 MG/1
15 TABLET, EXTENDED RELEASE ORAL 2 TIMES DAILY
COMMUNITY
Start: 2022-10-19 | End: 2023-11-09

## 2022-11-07 RX ORDER — TRIAMTERENE AND HYDROCHLOROTHIAZIDE 37.5; 25 MG/1; MG/1
1 CAPSULE ORAL DAILY
COMMUNITY
Start: 2022-10-19 | End: 2022-11-07

## 2022-11-07 RX ORDER — HYDROCORTISONE 25 MG/G
CREAM TOPICAL 2 TIMES DAILY
Qty: 20 G | Refills: 0 | Status: SHIPPED | OUTPATIENT
Start: 2022-11-07 | End: 2023-05-23

## 2022-11-07 RX ORDER — AMLODIPINE BESYLATE 2.5 MG/1
TABLET ORAL
COMMUNITY
End: 2022-11-07

## 2022-11-07 RX ADMIN — INFLUENZA VIRUS VACCINE 0.5 ML: 15; 15; 15; 15 SUSPENSION INTRAMUSCULAR at 10:11

## 2022-11-09 NOTE — PROGRESS NOTES
Attending Addendum:   Patient seen and examined in clinic. Management and Plan were discussed with resident. Care was reasonable and necessary.   Crystal Holloway MD  [facility]

## 2022-11-21 LAB — NONINV COLON CA DNA+OCC BLD SCRN STL QL: NORMAL

## 2022-12-09 LAB — NONINV COLON CA DNA+OCC BLD SCRN STL QL: NORMAL

## 2022-12-13 ENCOUNTER — TELEPHONE (OUTPATIENT)
Dept: INTERNAL MEDICINE | Facility: CLINIC | Age: 52
End: 2022-12-13
Payer: MEDICAID

## 2022-12-13 NOTE — TELEPHONE ENCOUNTER
----- Message from Miguel Russell MD sent at 12/10/2022  8:38 PM CST -----  Please call and inform patient that her cologuard/colon cancer screening was negative.

## 2023-01-04 ENCOUNTER — HOSPITAL ENCOUNTER (OUTPATIENT)
Dept: RADIOLOGY | Facility: HOSPITAL | Age: 53
Discharge: HOME | End: 2023-01-04
Attending: NURSE PRACTITIONER
Payer: MEDICAID

## 2023-01-04 DIAGNOSIS — Z12.31 SCREENING MAMMOGRAM FOR BREAST CANCER: ICD-10-CM

## 2023-01-04 PROCEDURE — 77063 MAMMO DIGITAL SCREENING BILAT WITH TOMO: ICD-10-PCS | Mod: 26,,, | Performed by: RADIOLOGY

## 2023-01-04 PROCEDURE — 77067 SCR MAMMO BI INCL CAD: CPT | Mod: 26,,, | Performed by: RADIOLOGY

## 2023-01-04 PROCEDURE — 77067 SCR MAMMO BI INCL CAD: CPT | Mod: TC

## 2023-01-04 PROCEDURE — 77063 BREAST TOMOSYNTHESIS BI: CPT | Mod: 26,,, | Performed by: RADIOLOGY

## 2023-01-04 PROCEDURE — 77067 MAMMO DIGITAL SCREENING BILAT WITH TOMO: ICD-10-PCS | Mod: 26,,, | Performed by: RADIOLOGY

## 2023-02-28 ENCOUNTER — CLINICAL SUPPORT (OUTPATIENT)
Dept: INTERNAL MEDICINE | Facility: CLINIC | Age: 53
End: 2023-02-28
Payer: MEDICAID

## 2023-02-28 DIAGNOSIS — E87.6 HYPOKALEMIA: ICD-10-CM

## 2023-02-28 DIAGNOSIS — E55.9 HYPOVITAMINOSIS D: ICD-10-CM

## 2023-02-28 DIAGNOSIS — I10 HYPERTENSION, UNSPECIFIED TYPE: Primary | ICD-10-CM

## 2023-02-28 DIAGNOSIS — Z12.11 COLON CANCER SCREENING: ICD-10-CM

## 2023-02-28 DIAGNOSIS — Z00.00 HEALTHCARE MAINTENANCE: ICD-10-CM

## 2023-02-28 RX ORDER — CHOLECALCIFEROL (VITAMIN D3) 25 MCG
1000 TABLET ORAL DAILY
Qty: 90 TABLET | Refills: 3 | Status: SHIPPED | OUTPATIENT
Start: 2023-02-28

## 2023-02-28 RX ORDER — DICLOFENAC SODIUM 50 MG/1
50 TABLET, DELAYED RELEASE ORAL 2 TIMES DAILY PRN
Qty: 30 TABLET | Refills: 1 | Status: SHIPPED | OUTPATIENT
Start: 2023-02-28

## 2023-02-28 RX ORDER — TRIAMTERENE AND HYDROCHLOROTHIAZIDE 37.5; 25 MG/1; MG/1
1 CAPSULE ORAL
COMMUNITY
Start: 2023-01-25 | End: 2023-02-28

## 2023-02-28 NOTE — PROGRESS NOTES
Established Patient - Audio Only Telehealth Visit     The patient location is: home  The chief complaint leading to consultation is: headaches  Visit type: Virtual visit with audio only (telephone)  Total time spent with patient: >45mins       The reason for the audio only service rather than synchronous audio and video virtual visit was related to technical difficulties or patient preference/necessity.     Each patient to whom I provide medical services by telemedicine is:  (1) informed of the relationship between the physician and patient and the respective role of any other health care provider with respect to management of the patient; and (2) notified that they may decline to receive medical services by telemedicine and may withdraw from such care at any time. Patient verbally consented to receive this service via voice-only telephone call.       HPI:   53 yo female presents for telemedicine visit today. She has history of HTN and hypokalemia. Patient reports new episodes of headaches which have started this past month. She has been taking 2 tylenols a day which has provided some relief of her symptoms. She denies any new visual changes, hearing changes, syncope, nausea, vomiting, cough, fevers, chills, palpitations, or chest pain. Patient is also complaining of heart burn symptoms after eating boiled shrimp. She says that she has been taking Tums which has helped her symptoms. Advised patient to watch her salt intake and to remain hydrated especially during crayfish and Kamar gras season. Aside from these issues, patient has been complaining of feeling funny and muscle weakness while taking her potassium supplements. She has since started taking only half of her potassium and reports resolution of her symptoms. Patient had mammogram done which came back negative. Patient also had cologuard done in December but was inconclusive. Will reorder at this time.    Review of Systems   Constitutional:  Negative for  chills and fever.   HENT:  Negative for congestion, sinus pain and sore throat.    Eyes:  Negative for blurred vision and double vision.   Respiratory:  Negative for cough, sputum production, shortness of breath and wheezing.    Cardiovascular:  Negative for chest pain, palpitations and leg swelling.   Gastrointestinal:  Negative for abdominal pain, nausea and vomiting.   Genitourinary:  Negative for dysuria.   Musculoskeletal:  Negative for myalgias.   Neurological:  Positive for dizziness and headaches. Negative for focal weakness, seizures, loss of consciousness and weakness.     Physical Exam:  Not done  due to virtual visit     Assessment and plan:      Headaches  -Currently taking Tylenol twice daily  -Advised patient to try not to take Tylenol daily as they can cause headaches  -Will prescribe NSAID at this time for headaches not resolved with tylenol but only as needed    Hemorrhoids (resolved)  Anal tear  -Reports burning with wiping  -No rash in area; small skin tear noted on exam  -Prescribed hydrocortisone cream  -Reports resolution of her symptoms     Hypertension  Hypokalemia  -Well-controlled; /82 at home  -Patient stated that she was previously on lisinopril which gave her a terrible headache; will avoid  -Continue Amlodipine 10mg daily and HCTZ 25 mg  -Currently only taking half potassium supplement  -Obtain CMP to reassess    CKD stage 1  -Taking meloxicam chronically; asked to discontinue all NSAIDs  -Continue HTN regimen  -Will monitor    Hypovitaminosis D  -Patient states that she has been taking her medication and is doing well  -Vitamin D level within normal limits  -Continue cholecalciferol daily     Healthcare maintenance  -Cologuard inconclusive; will reorder at this time  -Mammogram 2022 negative      Health Maintenance/ Wellness  Pneumococcal vaccine: Not applicable at this time  TDAP: Up-to-date (August 2018)  Influenza vaccine: UTD 11/22  Zoster/Shingrix vaccine: Not applicable at  this time  Screening colonoscopy: Previously inconclusive; reordered cologuard today  Pap smear: Negative 9/22  Mammogram: No mammographic evidence of malignancy in either breast (1-2023)  DEXA: N/A         This service was not originating from a related E/M service provided within the previous 7 days nor will  to an E/M service or procedure within the next 24 hours or my soonest available appointment.  Prevailing standard of care was able to be met in this audio-only visit.

## 2023-05-10 ENCOUNTER — LAB VISIT (OUTPATIENT)
Dept: LAB | Facility: HOSPITAL | Age: 53
End: 2023-05-10
Payer: MEDICAID

## 2023-05-10 DIAGNOSIS — Z00.00 HEALTHCARE MAINTENANCE: ICD-10-CM

## 2023-05-10 DIAGNOSIS — E55.9 HYPOVITAMINOSIS D: ICD-10-CM

## 2023-05-10 LAB
ALBUMIN SERPL-MCNC: 3.7 G/DL (ref 3.5–5)
ALBUMIN/GLOB SERPL: 0.9 RATIO (ref 1.1–2)
ALP SERPL-CCNC: 85 UNIT/L (ref 40–150)
ALT SERPL-CCNC: 18 UNIT/L (ref 0–55)
AST SERPL-CCNC: 17 UNIT/L (ref 5–34)
BASOPHILS # BLD AUTO: 0.01 X10(3)/MCL
BASOPHILS NFR BLD AUTO: 0.1 %
BILIRUBIN DIRECT+TOT PNL SERPL-MCNC: 0.1 MG/DL
BUN SERPL-MCNC: 16.9 MG/DL (ref 9.8–20.1)
CALCIUM SERPL-MCNC: 9.4 MG/DL (ref 8.4–10.2)
CHLORIDE SERPL-SCNC: 109 MMOL/L (ref 98–107)
CO2 SERPL-SCNC: 26 MMOL/L (ref 22–29)
CREAT SERPL-MCNC: 0.97 MG/DL (ref 0.55–1.02)
DEPRECATED CALCIDIOL+CALCIFEROL SERPL-MC: 40.5 NG/ML (ref 30–80)
EOSINOPHIL # BLD AUTO: 0 X10(3)/MCL (ref 0–0.9)
EOSINOPHIL NFR BLD AUTO: 0 %
ERYTHROCYTE [DISTWIDTH] IN BLOOD BY AUTOMATED COUNT: 14.6 % (ref 11.5–17)
GFR SERPLBLD CREATININE-BSD FMLA CKD-EPI: >60 MLS/MIN/1.73/M2
GLOBULIN SER-MCNC: 4.2 GM/DL (ref 2.4–3.5)
GLUCOSE SERPL-MCNC: 109 MG/DL (ref 74–100)
HCT VFR BLD AUTO: 38.4 % (ref 37–47)
HGB BLD-MCNC: 12.5 G/DL (ref 12–16)
IMM GRANULOCYTES # BLD AUTO: 0.04 X10(3)/MCL (ref 0–0.04)
IMM GRANULOCYTES NFR BLD AUTO: 0.3 %
LYMPHOCYTES # BLD AUTO: 1.94 X10(3)/MCL (ref 0.6–4.6)
LYMPHOCYTES NFR BLD AUTO: 16.9 %
MCH RBC QN AUTO: 27.5 PG (ref 27–31)
MCHC RBC AUTO-ENTMCNC: 32.6 G/DL (ref 33–36)
MCV RBC AUTO: 84.4 FL (ref 80–94)
MONOCYTES # BLD AUTO: 0.9 X10(3)/MCL (ref 0.1–1.3)
MONOCYTES NFR BLD AUTO: 7.8 %
NEUTROPHILS # BLD AUTO: 8.58 X10(3)/MCL (ref 2.1–9.2)
NEUTROPHILS NFR BLD AUTO: 74.9 %
NRBC BLD AUTO-RTO: 0 %
PLATELET # BLD AUTO: 306 X10(3)/MCL (ref 130–400)
PMV BLD AUTO: 9.8 FL (ref 7.4–10.4)
POTASSIUM SERPL-SCNC: 3.6 MMOL/L (ref 3.5–5.1)
PROT SERPL-MCNC: 7.9 GM/DL (ref 6.4–8.3)
RBC # BLD AUTO: 4.55 X10(6)/MCL (ref 4.2–5.4)
SODIUM SERPL-SCNC: 144 MMOL/L (ref 136–145)
WBC # SPEC AUTO: 11.47 X10(3)/MCL (ref 4.5–11.5)

## 2023-05-10 PROCEDURE — 85025 COMPLETE CBC W/AUTO DIFF WBC: CPT

## 2023-05-10 PROCEDURE — 36415 COLL VENOUS BLD VENIPUNCTURE: CPT

## 2023-05-10 PROCEDURE — 80053 COMPREHEN METABOLIC PANEL: CPT

## 2023-05-10 PROCEDURE — 82306 VITAMIN D 25 HYDROXY: CPT

## 2023-05-23 ENCOUNTER — OFFICE VISIT (OUTPATIENT)
Dept: INTERNAL MEDICINE | Facility: CLINIC | Age: 53
End: 2023-05-23
Payer: MEDICAID

## 2023-05-23 VITALS
WEIGHT: 236.63 LBS | DIASTOLIC BLOOD PRESSURE: 83 MMHG | SYSTOLIC BLOOD PRESSURE: 128 MMHG | RESPIRATION RATE: 20 BRPM | HEIGHT: 65 IN | TEMPERATURE: 98 F | OXYGEN SATURATION: 100 % | BODY MASS INDEX: 39.42 KG/M2 | HEART RATE: 73 BPM

## 2023-05-23 DIAGNOSIS — I10 HYPERTENSION, UNSPECIFIED TYPE: Primary | ICD-10-CM

## 2023-05-23 DIAGNOSIS — B37.2 CUTANEOUS CANDIDIASIS: ICD-10-CM

## 2023-05-23 LAB — HBA1C MFR BLD: 6 %

## 2023-05-23 PROCEDURE — 83036 HEMOGLOBIN GLYCOSYLATED A1C: CPT | Mod: PBBFAC

## 2023-05-23 PROCEDURE — 99214 OFFICE O/P EST MOD 30 MIN: CPT | Mod: PBBFAC

## 2023-05-23 RX ORDER — CLOTRIMAZOLE 1 %
CREAM (GRAM) TOPICAL 2 TIMES DAILY
Qty: 24 G | Refills: 0 | Status: SHIPPED | OUTPATIENT
Start: 2023-05-23 | End: 2023-10-04 | Stop reason: SDUPTHER

## 2023-05-23 RX ORDER — LOSARTAN POTASSIUM 50 MG/1
50 TABLET ORAL DAILY
Qty: 90 TABLET | Refills: 3 | Status: SHIPPED | OUTPATIENT
Start: 2023-05-23 | End: 2023-09-27 | Stop reason: SDUPTHER

## 2023-05-23 NOTE — PROGRESS NOTES
[facility] INTERNAL MEDICINE  OUTPATIENT OFFICE VISIT NOTE       Chief Complaint: Hypertension (Denies complaints  exercise 3 x a week  )       HPI: Vicky Pereira is a 52 y.o. yo female with  has a past medical history of Bilateral calf pain, Essential (primary) hypertension, and Hypokalemia., who presents for  follow up appointment.  Patient previously being evaluated for anal tear due to burning with wiping last visit. Since her last visit, patient reports resolution of symptoms. Patient's only complaint today is new rash under her breast that is itching and slightly erythematous. States symptoms only occurred recently. She denies any fevers, chills, SOB, abdominal pain, chest pain, or sick contacts. Aside from this, patient had labs drawn before visit and was found to have potassium 3.6. She previously was evaluated for hypokalemia and was previously taking supplements but stopped due to it making her feel tired and heavy. She is currently taking HCTZ for blood pressure with good control. She took Lisinopril and Amlodipine in the past but were discontinued due to cough/headache and leg swelling respectively. Patient has never tried Losartan before and plan to trial patient on that today. Patient is caught up on vaccinations and screenings. POCT A1c today was 6.0 so patient is in prediabetic  range. Advised patient to cut down on sugary soft drinks and juices along with trying to maintain exercise schedule of 30 minutes physical activity per day for 5 days per week. Patient reported understanding and will work towards theses changes.      Past Medical History:   has a past medical history of Bilateral calf pain, Essential (primary) hypertension, and Hypokalemia.     Past Surgical History:   has a past surgical history that includes Bilateral tubal ligation (04/15/2001).     Family History:  family history includes Cancer in her father and mother; Heart failure in her brother; Stroke in her brother and sister.     Social  History:   reports that she has never smoked. She has never used smokeless tobacco. She reports that she does not currently use alcohol. She reports that she does not use drugs.     Allergies:  has No Known Allergies.     Home Medications:  Prior to Admission medications    Medication Sig Start Date End Date Taking? Authorizing Provider   baclofen (LIORESAL) 10 MG tablet   See Instructions, TAKE 1 TABLET BY MOUTH AT BEDTIME, DONT MIX WITH ALCOHOL OR ANY OTHER SEDATING MEDS. DONT OPERATE HEAVY MACHINERY, # 30 tab(s), 3 Refill(s), Pharmacy: [id number], 168, cm, Height/Length Dosing, 11/08/21 8:02:00 CST, 108.7, kg, W... 10/13/21   Historical Provider   cetirizine (ZYRTEC) 10 MG tablet Take 1 tablet (10 mg total) by mouth once daily. for 14 days 10/28/22 11/11/22  CLAUDIA Mitchell   diclofenac (VOLTAREN) 50 MG EC tablet Take 1 tablet (50 mg total) by mouth 2 (two) times daily as needed (pain). 2/28/23   Miguel Russell MD   hydroCHLOROthiazide (HYDRODIURIL) 25 MG tablet Take 1 tablet (25 mg total) by mouth once daily. 6/16/22 6/16/23  Stanley Rene MD   hydrocortisone 2.5 % cream Apply topically 2 (two) times daily. 11/7/22   Miguel Russell MD   KLOR-CON M15 15 mEq tablet Take 15 mEq by mouth 2 (two) times daily. 1/2 tab 10/19/22   Historical Provider   vitamin D (VITAMIN D3) 1000 units Tab Take 1 tablet (1,000 Units total) by mouth once daily. 2/28/23   Miguel Russell MD       ROS:  Constitutional: no fever, fatigue, weakness  Eye: no vision loss, eye redness, drainage, or pain  ENMT: no sore throat, ear pain, sinus pain/congestion, nasal congestion/drainage  Respiratory: no cough, no wheezing, no shortness of breath  Cardiovascular: no chest pain, no palpitations, no edema  Gastrointestinal: no nausea, vomiting, or diarrhea. No abdominal pain  Genitourinary: no dysuria, no urinary frequency or urgency, no hematuria  Hema/Lymph: no abnormal bruising or bleeding  Endocrine: no heat or cold  "intolerance, no excessive thirst or excessive urination  Musculoskeletal: no muscle or joint pain, no joint swelling  Integumentary: erythematous skin rash under breast  Neurologic: no headache, no dizziness, no weakness or numbness    OBJECTIVE:     Vital signs:   /83 (BP Location: Left arm, Patient Position: Sitting, BP Method: X-Large (Automatic))   Pulse 73   Temp 98 °F (36.7 °C)   Resp 20   Ht 5' 5" (1.651 m)   Wt 107.3 kg (236 lb 9.6 oz)   SpO2 100%   BMI 39.37 kg/m²      Physical Examination:  Physical Exam  Constitutional:       General: She is not in acute distress.     Appearance: Normal appearance.   HENT:      Head: Normocephalic.      Right Ear: Tympanic membrane normal.      Left Ear: Tympanic membrane normal.      Mouth/Throat:      Mouth: Mucous membranes are dry.      Pharynx: Oropharynx is clear.   Eyes:      Conjunctiva/sclera: Conjunctivae normal.      Pupils: Pupils are equal, round, and reactive to light.   Cardiovascular:      Rate and Rhythm: Normal rate and regular rhythm.      Pulses: Normal pulses.      Heart sounds: No murmur heard.  Pulmonary:      Effort: Pulmonary effort is normal. No respiratory distress.   Chest:      Chest wall: No tenderness.   Abdominal:      General: Abdomen is flat. Bowel sounds are normal. There is no distension.      Palpations: Abdomen is soft.      Tenderness: There is no abdominal tenderness.   Musculoskeletal:         General: Normal range of motion.      Cervical back: Normal range of motion.   Skin:     General: Skin is warm.      Comments: Erythematous rash around area under breast; no skin peeling noted   Neurological:      General: No focal deficit present.      Mental Status: She is alert and oriented to person, place, and time.        Labs:  Lab Results   Component Value Date    WBC 11.47 05/10/2023    HGB 12.5 05/10/2023    HCT 38.4 05/10/2023     05/10/2023    MCV 84.4 05/10/2023    RDW 14.6 05/10/2023    Lab Results "   Component Value Date     05/10/2023    K 3.6 05/10/2023    CO2 26 05/10/2023    BUN 16.9 05/10/2023    CREATININE 0.97 05/10/2023    CALCIUM 9.4 05/10/2023    MG 2.20 05/23/2022    PHOS 2.8 05/23/2022      Lab Results   Component Value Date    HGBA1C 5.9 05/23/2022    .6 05/23/2022    CREATININE 0.97 05/10/2023    Lab Results   Component Value Date    TSH 0.7265 05/23/2022    CGBDQF4MIVS 0.94 05/23/2022                  ASSESSMENT & PLAN:     Hemorrhoids (resolved)  Anal tear  -Reports burning with wiping  -No rash in area; small skin tear noted on exam  -Prescribed hydrocortisone cream last visit; did not   -Reports resolution of symptoms at this time  -Consider getting colonoscopy next colon cancer screening due to bleeding    Rash under breast  -Suspect fungal skin rash at this time  -Will prescribe Clotrimazole cream   -Advised patient to keep skin around area clean and dry  -Will continue to monitor     Hypertension  Hypokalemia  -Well-controlled; /83 in clinic  -Patient stated that she was previously on lisinopril which gave her a terrible headache; will avoid  Discontinued Amlodipine due to lower extremity swelling; discontinue HCTZ due to hypokalemia  -Will trial patient on Losartan 50 mg daily at this time  -Stopped taking potassium supplement  -K lower limits of normal 3.6    CKD stage 1  -Taking meloxicam chronically; asked to discontinue all NSAIDs  -Continue HTN regimen  -Will monitor    Prediabetes  -POCT A1c today was 6.0  -Reports drinking soft drinks at least one per day  -Advised patient on cutting down sugary drinks like sodas and juices  -Advised patient to try to exercise 30 minutes per day 5 days per week to help control glucose  -No plans to start diabetic medications at this time  -Will continue to monitor    Hypovitaminosis D  -Patient states that she has been taking her medication and is doing well  -Vitamin D level within normal limits  -Continue cholecalciferol  daily    Health Maintenance   Topic Date Due    Mammogram  01/04/2024    Lipid Panel  05/16/2027    TETANUS VACCINE  08/20/2028    Hepatitis C Screening  Completed        Health Maintenance/ Wellness  Pneumococcal vaccine: Not applicable at this time  TDAP: Up-to-date (August 2018)  Influenza vaccine: UTD 11/22  Zoster/Shingrix vaccine: Not applicable at this time  Screening colonoscopy: Cologuard negative 11/2022  Pap smear: Negative 9/22  Mammogram: Negative BI-RADS1; due 1/2024      Labs ordered: CBC, CMP  Imaging: N/A  Medications: reconciled, discussed and refills given.  RTC in 6 months      Miguel Russell MD  \[facility] [place] [facility]\"" Internal Medicine, -1

## 2023-07-18 ENCOUNTER — HOSPITAL ENCOUNTER (EMERGENCY)
Facility: HOSPITAL | Age: 53
Discharge: HOME | End: 2023-07-18
Attending: EMERGENCY MEDICINE
Payer: MEDICAID

## 2023-07-18 VITALS
WEIGHT: 240.31 LBS | OXYGEN SATURATION: 99 % | DIASTOLIC BLOOD PRESSURE: 91 MMHG | HEART RATE: 85 BPM | SYSTOLIC BLOOD PRESSURE: 154 MMHG | RESPIRATION RATE: 16 BRPM | BODY MASS INDEX: 40.04 KG/M2 | HEIGHT: 65 IN | TEMPERATURE: 98 F

## 2023-07-18 DIAGNOSIS — R05.9 COUGH, UNSPECIFIED TYPE: ICD-10-CM

## 2023-07-18 DIAGNOSIS — J32.9 SINUSITIS, UNSPECIFIED CHRONICITY, UNSPECIFIED LOCATION: Primary | ICD-10-CM

## 2023-07-18 PROCEDURE — 99284 EMERGENCY DEPT VISIT MOD MDM: CPT

## 2023-07-18 RX ORDER — CETIRIZINE HYDROCHLORIDE 10 MG/1
10 TABLET ORAL DAILY
Qty: 14 TABLET | Refills: 0 | Status: SHIPPED | OUTPATIENT
Start: 2023-07-18 | End: 2023-11-09

## 2023-07-18 RX ORDER — BENZONATATE 100 MG/1
100 CAPSULE ORAL 3 TIMES DAILY PRN
Qty: 30 CAPSULE | Refills: 0 | Status: SHIPPED | OUTPATIENT
Start: 2023-07-18 | End: 2023-07-28

## 2023-07-18 RX ORDER — METHYLPREDNISOLONE 4 MG/1
TABLET ORAL
Qty: 21 EACH | Refills: 0 | Status: SHIPPED | OUTPATIENT
Start: 2023-07-18 | End: 2023-11-09

## 2023-07-18 RX ORDER — AMOXICILLIN AND CLAVULANATE POTASSIUM 875; 125 MG/1; MG/1
1 TABLET, FILM COATED ORAL 2 TIMES DAILY
Qty: 14 TABLET | Refills: 0 | Status: SHIPPED | OUTPATIENT
Start: 2023-07-18 | End: 2023-11-09

## 2023-07-18 NOTE — ED PROVIDER NOTES
Encounter Date: 7/18/2023       History     Chief Complaint   Patient presents with    Cough     PT W CO PRODUCTIVE COUGH, SORE THROAT, SINUS CONGESTION  AND HA X 4 DAYS.       Pt is a 53 y.o. female who presents to the [facility] ED complaining of cough sinus pain x 1 week. Reports mild sinus congestion at first that has now gotten worse. Denies chest pain, SOB, weakness, dizziness, fever, abdominal pain, or loss of bowel or bladder control. Hx of HTN.    Review of patient's allergies indicates:  No Known Allergies  Past Medical History:   Diagnosis Date    Bilateral calf pain     cramping calf pain for months    Essential (primary) hypertension     Hypokalemia      Past Surgical History:   Procedure Laterality Date    BILATERAL TUBAL LIGATION  04/15/2001     Family History   Problem Relation Age of Onset    Cancer Mother         brain tumor    Cancer Father         stomach cancer    Stroke Sister     Heart failure Brother     Stroke Brother      Social History     Tobacco Use    Smoking status: Never    Smokeless tobacco: Never   Substance Use Topics    Alcohol use: Not Currently    Drug use: Never     Review of Systems   Constitutional:  Negative for chills, diaphoresis, fatigue and fever.   HENT:  Positive for sinus pressure and sinus pain. Negative for facial swelling, rhinorrhea, sore throat and trouble swallowing.    Respiratory:  Positive for cough. Negative for chest tightness, shortness of breath and wheezing.    Cardiovascular:  Negative for chest pain, palpitations and leg swelling.   Gastrointestinal:  Negative for abdominal pain, diarrhea, nausea and vomiting.   Genitourinary:  Negative for dysuria, flank pain, frequency, hematuria and urgency.   Musculoskeletal:  Negative for arthralgias, back pain, joint swelling and myalgias.   Skin:  Negative for color change and rash.   Neurological:  Negative for dizziness, syncope, weakness and light-headedness.   Hematological:  Does not bruise/bleed easily.   All  other systems reviewed and are negative.    Physical Exam     Initial Vitals [07/18/23 1809]   BP Pulse Resp Temp SpO2   (!) 154/91 85 16 97.9 °F (36.6 °C) 99 %      MAP       --         Physical Exam    Nursing note and vitals reviewed.  Constitutional: She appears well-developed and well-nourished.   HENT:   Head: Normocephalic and atraumatic.   Nose: Mucosal edema and rhinorrhea present. Right sinus exhibits maxillary sinus tenderness. Left sinus exhibits maxillary sinus tenderness.   Mouth/Throat: Oropharynx is clear and moist.   Erythema to bilateral nares.      Eyes: Conjunctivae and EOM are normal. Pupils are equal, round, and reactive to light.   Neck: Neck supple.   Normal range of motion.  Cardiovascular:  Normal rate, regular rhythm, normal heart sounds and intact distal pulses.           Pulmonary/Chest: Effort normal and breath sounds normal. No respiratory distress. She has no wheezes. She has no rhonchi. She has no rales. She exhibits no tenderness.   Dry cough present.     Abdominal: Abdomen is soft and flat. Bowel sounds are normal. She exhibits no distension. There is no abdominal tenderness. There is no rebound, no guarding, no tenderness at McBurney's point and negative Lee's sign. negative psoas sign  Musculoskeletal:         General: Normal range of motion.      Cervical back: Normal range of motion and neck supple.     Neurological: She is alert and oriented to person, place, and time. She has normal strength and normal reflexes.   Skin: Skin is warm and dry. Capillary refill takes less than 2 seconds.   Psychiatric: She has a normal mood and affect. Her speech is normal and behavior is normal. Judgment and thought content normal.       ED Course   Procedures  Labs Reviewed - No data to display       Imaging Results    None          Medications - No data to display  Medical Decision Making:   Differential Diagnosis:   URI  Sinusitis    ED Management:  6:25 PM Reassessed patient at this  time. Reports condition has improved. Discussed with patient all pertinent ED information and results. Discussed diagnosis and treatment plan with patient. Follow up instructions and return to ED instruction have been given. All questions and concerns were addressed at this time. Patient voices understanding of information and instructions. Patient is comfortable with plan and discharge. Patient is stable for discharge.                           Clinical Impression:   Final diagnoses:  [J32.9] Sinusitis, unspecified chronicity, unspecified location (Primary)  [R05.9] Cough, unspecified type        ED Disposition Condition    Discharge Stable          ED Prescriptions       Medication Sig Dispense Start Date End Date Auth. Provider    cetirizine (ZYRTEC) 10 MG tablet Take 1 tablet (10 mg total) by mouth once daily. for 14 days 14 tablet 7/18/2023 8/1/2023 Ladarius Snow Jr., LYNN    methylPREDNISolone (MEDROL DOSEPACK) 4 mg tablet use as directed 21 each 7/18/2023 -- LYNN Kidd Jr.    amoxicillin-clavulanate 875-125mg (AUGMENTIN) 875-125 mg per tablet Take 1 tablet by mouth 2 (two) times daily. 14 tablet 7/18/2023 -- LYNN Kidd Jr.    benzonatate (TESSALON) 100 MG capsule Take 1 capsule (100 mg total) by mouth 3 (three) times daily as needed for Cough (Cough). 30 capsule 7/18/2023 7/28/2023 LYNN Kidd Jr.          Follow-up Information       Follow up With Specialties Details Why Contact Info    Miguel Russell MD Internal Medicine In 3 days  [address] [facility] [id number]  [phone]      [facility] Emergency Medicine In 3 days As needed, If symptoms worsen [address] [facility] [id number] [phone]             LYNN Kidd Jr.  07/18/23 [id number]

## 2023-07-25 DIAGNOSIS — I10 HYPERTENSION, UNSPECIFIED TYPE: Primary | ICD-10-CM

## 2023-07-25 RX ORDER — TRIAMTERENE AND HYDROCHLOROTHIAZIDE 37.5; 25 MG/1; MG/1
1 CAPSULE ORAL EVERY MORNING
COMMUNITY
End: 2023-07-25 | Stop reason: SDUPTHER

## 2023-07-26 RX ORDER — TRIAMTERENE AND HYDROCHLOROTHIAZIDE 37.5; 25 MG/1; MG/1
1 CAPSULE ORAL EVERY MORNING
Qty: 30 CAPSULE | Refills: 3 | Status: SHIPPED | OUTPATIENT
Start: 2023-07-26 | End: 2023-10-04 | Stop reason: SDUPTHER

## 2023-08-03 LAB — NONINV COLON CA DNA+OCC BLD SCRN STL QL: NORMAL

## 2023-09-09 LAB — NONINV COLON CA DNA+OCC BLD SCRN STL QL: NEGATIVE

## 2023-09-27 DIAGNOSIS — I10 HYPERTENSION, UNSPECIFIED TYPE: ICD-10-CM

## 2023-09-27 RX ORDER — LOSARTAN POTASSIUM 50 MG/1
50 TABLET ORAL DAILY
Qty: 90 TABLET | Refills: 3 | Status: SHIPPED | OUTPATIENT
Start: 2023-09-27 | End: 2024-09-26

## 2023-10-04 ENCOUNTER — OFFICE VISIT (OUTPATIENT)
Dept: GYNECOLOGY | Facility: CLINIC | Age: 53
End: 2023-10-04
Payer: MEDICAID

## 2023-10-04 VITALS
DIASTOLIC BLOOD PRESSURE: 82 MMHG | HEART RATE: 91 BPM | WEIGHT: 238.38 LBS | TEMPERATURE: 98 F | OXYGEN SATURATION: 100 % | HEIGHT: 65 IN | RESPIRATION RATE: 18 BRPM | SYSTOLIC BLOOD PRESSURE: 118 MMHG | BODY MASS INDEX: 39.72 KG/M2

## 2023-10-04 DIAGNOSIS — B37.2 CUTANEOUS CANDIDIASIS: ICD-10-CM

## 2023-10-04 DIAGNOSIS — Z01.419 WOMEN'S ANNUAL ROUTINE GYNECOLOGICAL EXAMINATION: Primary | ICD-10-CM

## 2023-10-04 DIAGNOSIS — Z12.31 SCREENING MAMMOGRAM FOR BREAST CANCER: ICD-10-CM

## 2023-10-04 DIAGNOSIS — I10 HYPERTENSION, UNSPECIFIED TYPE: ICD-10-CM

## 2023-10-04 PROCEDURE — 3008F BODY MASS INDEX DOCD: CPT | Mod: CPTII,,, | Performed by: NURSE PRACTITIONER

## 2023-10-04 PROCEDURE — 1159F MED LIST DOCD IN RCRD: CPT | Mod: CPTII,,, | Performed by: NURSE PRACTITIONER

## 2023-10-04 PROCEDURE — 99396 PR PREVENTIVE VISIT,EST,40-64: ICD-10-PCS | Mod: S$PBB,,, | Performed by: NURSE PRACTITIONER

## 2023-10-04 PROCEDURE — 3079F DIAST BP 80-89 MM HG: CPT | Mod: CPTII,,, | Performed by: NURSE PRACTITIONER

## 2023-10-04 PROCEDURE — 1159F PR MEDICATION LIST DOCUMENTED IN MEDICAL RECORD: ICD-10-PCS | Mod: CPTII,,, | Performed by: NURSE PRACTITIONER

## 2023-10-04 PROCEDURE — 3079F PR MOST RECENT DIASTOLIC BLOOD PRESSURE 80-89 MM HG: ICD-10-PCS | Mod: CPTII,,, | Performed by: NURSE PRACTITIONER

## 2023-10-04 PROCEDURE — 3074F SYST BP LT 130 MM HG: CPT | Mod: CPTII,,, | Performed by: NURSE PRACTITIONER

## 2023-10-04 PROCEDURE — 3044F PR MOST RECENT HEMOGLOBIN A1C LEVEL <7.0%: ICD-10-PCS | Mod: CPTII,,, | Performed by: NURSE PRACTITIONER

## 2023-10-04 PROCEDURE — 3044F HG A1C LEVEL LT 7.0%: CPT | Mod: CPTII,,, | Performed by: NURSE PRACTITIONER

## 2023-10-04 PROCEDURE — 4010F PR ACE/ARB THEARPY RXD/TAKEN: ICD-10-PCS | Mod: CPTII,,, | Performed by: NURSE PRACTITIONER

## 2023-10-04 PROCEDURE — 3008F PR BODY MASS INDEX (BMI) DOCUMENTED: ICD-10-PCS | Mod: CPTII,,, | Performed by: NURSE PRACTITIONER

## 2023-10-04 PROCEDURE — 1160F RVW MEDS BY RX/DR IN RCRD: CPT | Mod: CPTII,,, | Performed by: NURSE PRACTITIONER

## 2023-10-04 PROCEDURE — 99396 PREV VISIT EST AGE 40-64: CPT | Mod: S$PBB,,, | Performed by: NURSE PRACTITIONER

## 2023-10-04 PROCEDURE — 1160F PR REVIEW ALL MEDS BY PRESCRIBER/CLIN PHARMACIST DOCUMENTED: ICD-10-PCS | Mod: CPTII,,, | Performed by: NURSE PRACTITIONER

## 2023-10-04 PROCEDURE — 4010F ACE/ARB THERAPY RXD/TAKEN: CPT | Mod: CPTII,,, | Performed by: NURSE PRACTITIONER

## 2023-10-04 PROCEDURE — 3074F PR MOST RECENT SYSTOLIC BLOOD PRESSURE < 130 MM HG: ICD-10-PCS | Mod: CPTII,,, | Performed by: NURSE PRACTITIONER

## 2023-10-04 PROCEDURE — 99214 OFFICE O/P EST MOD 30 MIN: CPT | Mod: PBBFAC | Performed by: NURSE PRACTITIONER

## 2023-10-04 RX ORDER — CLOTRIMAZOLE 1 %
CREAM (GRAM) TOPICAL 2 TIMES DAILY
Qty: 1 EACH | Refills: 0 | Status: SHIPPED | OUTPATIENT
Start: 2023-10-04 | End: 2023-11-09

## 2023-10-04 NOTE — TELEPHONE ENCOUNTER
----- Message from Tamra Muro sent at 10/4/2023  2:28 PM CDT -----           Provider: ANNALISA Russell      Last Visit: 05/2023       Next Visit: 11/2023       Patient's Contact #: [phone]       Medication Name & Dose: triamterene-hydrochlorothiazide 37.5-25 mg (DYAZIDE) 37.5-25 mg per capsule       Preferred Pharmacy: [id number] - [place] [address]      Comment: Patient need refill of this medication. Thanks

## 2023-10-04 NOTE — PROGRESS NOTES
"Patient ID: Vicky Pereira is a 53 y.o. female.    Chief Complaint: Gynecologic Exam      Review of patient's allergies indicates:  No Known Allergies          HPI:  Pt is  here for annual gyn exam. Denies hx of abnormal pap.Pt is postmenopausal. Denies vaginal bleeding/discharge .Denies pain. Denies fly hx of breast, ovarian, or uterine cancer. Pt is followed in medicine clinic for primary care. Last pap 2022-NIL; HPV 16/18/45. Pt reports occasional rash under bilateral breast. States has used clotrimazole with improvement and would like refill.   Review of Systems:   Negative except for findings in HPI     Objective:   /82   Pulse 91   Temp 97.7 °F (36.5 °C) (Oral)   Resp 18   Ht 5' 5" (1.651 m)   Wt 108.1 kg (238 lb 6.4 oz)   SpO2 100%   BMI 39.67 kg/m²    Physical Exam:  GENERAL: Pt is aware and alert and  in no acute distress.  BREASTS: Bilateral-No masses, nipple discharge, skin changes, or tenderness. Hyperpigmentation under bilateral breast  ABDOMEN: Soft, non tender.  VULVA:  No lesions or skin changes.  URETHRA: No lesions  BLADDER: No tenderness.  VAGINA: Mucosa normal,no discharge; no lesions.  CERVIX:  no CMT, NO discharge; NO lesions  BIMANUAL EXAM:  The uterus is mobile, nontender, no palpable masses. Obdulio adnexa reveal no evidence of masses; no fullness   SKIN: Warm and Dry  PSYCHIATRIC: Patient is awake and alert. Mood and affect are normal.    Assessment:   Women's annual routine gynecological examination    Screening mammogram for breast cancer  -     Mammo Digital Screening Bilat w/ Miles; Future; Expected date: 2024    Cutaneous candidiasis  -     clotrimazole (LOTRIMIN) 1 % cream; Apply topically 2 (two) times daily. for 7 days  Dispense: 1 each; Refill: 0            1. Women's annual routine gynecological examination    2. Screening mammogram for breast cancer    3. Cutaneous candidiasis             -pelvic; pap UTD per ACOG guidelines  -declined sti screening  -keep area " under breast dry; use otc barrier ointment aquaphor bid daily; lotrimin rx refill provided to use only with flare up  Plan:       Follow up in about 1 year (around 10/4/2024).

## 2023-10-05 RX ORDER — TRIAMTERENE AND HYDROCHLOROTHIAZIDE 37.5; 25 MG/1; MG/1
1 CAPSULE ORAL EVERY MORNING
Qty: 30 CAPSULE | Refills: 3 | Status: SHIPPED | OUTPATIENT
Start: 2023-10-05 | End: 2023-11-09 | Stop reason: SDUPTHER

## 2023-11-07 ENCOUNTER — LAB VISIT (OUTPATIENT)
Dept: LAB | Facility: HOSPITAL | Age: 53
End: 2023-11-07
Payer: MEDICAID

## 2023-11-07 DIAGNOSIS — I10 HYPERTENSION, UNSPECIFIED TYPE: ICD-10-CM

## 2023-11-07 LAB
ALBUMIN SERPL-MCNC: 3.7 G/DL (ref 3.5–5)
ALBUMIN/GLOB SERPL: 0.9 RATIO (ref 1.1–2)
ALP SERPL-CCNC: 84 UNIT/L (ref 40–150)
ALT SERPL-CCNC: 15 UNIT/L (ref 0–55)
AST SERPL-CCNC: 17 UNIT/L (ref 5–34)
BASOPHILS # BLD AUTO: 0.03 X10(3)/MCL
BASOPHILS NFR BLD AUTO: 0.5 %
BILIRUB SERPL-MCNC: 0.2 MG/DL
BUN SERPL-MCNC: 15.5 MG/DL (ref 9.8–20.1)
CALCIUM SERPL-MCNC: 9.7 MG/DL (ref 8.4–10.2)
CHLORIDE SERPL-SCNC: 107 MMOL/L (ref 98–107)
CO2 SERPL-SCNC: 26 MMOL/L (ref 22–29)
CREAT SERPL-MCNC: 0.95 MG/DL (ref 0.55–1.02)
EOSINOPHIL # BLD AUTO: 0.09 X10(3)/MCL (ref 0–0.9)
EOSINOPHIL NFR BLD AUTO: 1.6 %
ERYTHROCYTE [DISTWIDTH] IN BLOOD BY AUTOMATED COUNT: 13.7 % (ref 11.5–17)
GFR SERPLBLD CREATININE-BSD FMLA CKD-EPI: >60 MLS/MIN/1.73/M2
GLOBULIN SER-MCNC: 3.9 GM/DL (ref 2.4–3.5)
GLUCOSE SERPL-MCNC: 94 MG/DL (ref 74–100)
HCT VFR BLD AUTO: 39 % (ref 37–47)
HGB BLD-MCNC: 12.6 G/DL (ref 12–16)
IMM GRANULOCYTES # BLD AUTO: 0.01 X10(3)/MCL (ref 0–0.04)
IMM GRANULOCYTES NFR BLD AUTO: 0.2 %
LYMPHOCYTES # BLD AUTO: 2.36 X10(3)/MCL (ref 0.6–4.6)
LYMPHOCYTES NFR BLD AUTO: 42.3 %
MCH RBC QN AUTO: 27.6 PG (ref 27–31)
MCHC RBC AUTO-ENTMCNC: 32.3 G/DL (ref 33–36)
MCV RBC AUTO: 85.5 FL (ref 80–94)
MONOCYTES # BLD AUTO: 0.39 X10(3)/MCL (ref 0.1–1.3)
MONOCYTES NFR BLD AUTO: 7 %
NEUTROPHILS # BLD AUTO: 2.7 X10(3)/MCL (ref 2.1–9.2)
NEUTROPHILS NFR BLD AUTO: 48.4 %
NRBC BLD AUTO-RTO: 0 %
PLATELET # BLD AUTO: 289 X10(3)/MCL (ref 130–400)
PMV BLD AUTO: 10.6 FL (ref 7.4–10.4)
POTASSIUM SERPL-SCNC: 3.2 MMOL/L (ref 3.5–5.1)
PROT SERPL-MCNC: 7.6 GM/DL (ref 6.4–8.3)
RBC # BLD AUTO: 4.56 X10(6)/MCL (ref 4.2–5.4)
SODIUM SERPL-SCNC: 141 MMOL/L (ref 136–145)
WBC # SPEC AUTO: 5.58 X10(3)/MCL (ref 4.5–11.5)

## 2023-11-07 PROCEDURE — 36415 COLL VENOUS BLD VENIPUNCTURE: CPT

## 2023-11-07 PROCEDURE — 80053 COMPREHEN METABOLIC PANEL: CPT

## 2023-11-07 PROCEDURE — 85025 COMPLETE CBC W/AUTO DIFF WBC: CPT

## 2023-11-09 ENCOUNTER — OFFICE VISIT (OUTPATIENT)
Dept: INTERNAL MEDICINE | Facility: CLINIC | Age: 53
End: 2023-11-09
Payer: MEDICAID

## 2023-11-09 VITALS
TEMPERATURE: 98 F | RESPIRATION RATE: 18 BRPM | OXYGEN SATURATION: 99 % | DIASTOLIC BLOOD PRESSURE: 78 MMHG | HEART RATE: 90 BPM | BODY MASS INDEX: 39.76 KG/M2 | SYSTOLIC BLOOD PRESSURE: 138 MMHG | WEIGHT: 238.63 LBS | HEIGHT: 65 IN

## 2023-11-09 DIAGNOSIS — E87.6 HYPOKALEMIA: ICD-10-CM

## 2023-11-09 DIAGNOSIS — I10 HYPERTENSION, UNSPECIFIED TYPE: ICD-10-CM

## 2023-11-09 DIAGNOSIS — M79.674 PAIN OF TOE OF RIGHT FOOT: ICD-10-CM

## 2023-11-09 DIAGNOSIS — R68.89 FLU-LIKE SYMPTOMS: Primary | ICD-10-CM

## 2023-11-09 LAB
FLUAV AG UPPER RESP QL IA.RAPID: NOT DETECTED
FLUBV AG UPPER RESP QL IA.RAPID: NOT DETECTED
RSV A 5' UTR RNA NPH QL NAA+PROBE: NOT DETECTED
SARS-COV-2 RNA RESP QL NAA+PROBE: NOT DETECTED

## 2023-11-09 PROCEDURE — 99213 OFFICE O/P EST LOW 20 MIN: CPT | Mod: PBBFAC

## 2023-11-09 PROCEDURE — 0241U COVID/RSV/FLU A&B PCR: CPT

## 2023-11-09 RX ORDER — POTASSIUM CHLORIDE 750 MG/1
10 CAPSULE, EXTENDED RELEASE ORAL DAILY
Qty: 30 CAPSULE | Refills: 3 | Status: SHIPPED | OUTPATIENT
Start: 2023-11-09

## 2023-11-09 RX ORDER — BENZONATATE 200 MG/1
200 CAPSULE ORAL 3 TIMES DAILY PRN
Qty: 30 CAPSULE | Refills: 0 | Status: SHIPPED | OUTPATIENT
Start: 2023-11-09 | End: 2023-11-19

## 2023-11-09 RX ORDER — TRIAMTERENE AND HYDROCHLOROTHIAZIDE 37.5; 25 MG/1; MG/1
1 CAPSULE ORAL EVERY MORNING
Qty: 30 CAPSULE | Refills: 3 | Status: SHIPPED | OUTPATIENT
Start: 2023-11-09

## 2023-11-09 RX ORDER — BACLOFEN 10 MG/1
TABLET ORAL
Qty: 30 TABLET | Refills: 3 | Status: SHIPPED | OUTPATIENT
Start: 2023-11-09

## 2023-11-09 RX ORDER — GUAIFENESIN 100 MG/5ML
200 SOLUTION ORAL 3 TIMES DAILY PRN
Qty: 180 ML | Refills: 0 | Status: SHIPPED | OUTPATIENT
Start: 2023-11-09 | End: 2023-11-19

## 2023-11-09 NOTE — PROGRESS NOTES
[facility] INTERNAL MEDICINE  OUTPATIENT OFFICE VISIT NOTE       Chief Complaint: Follow-up (Patient states she has body aches, chills, sore throat x 1 day. Patient also states she has right toe on right foot pain 10/10.)       HPI: Vicky Pereira is a 53 y.o. yo female with  has a past medical history of Bilateral calf pain, Essential (primary) hypertension, and Hypokalemia., who presents for  follow up appointment.  Patient presents complaining of myalgias, dry cough, and sore throat taht started yesterday. Patient received flu vaccine last month. She reports sick contacts with children who were recently diagnosed with RSV. She denies any fevers during this time. In addition to this, patient also has new right toe pain that started after having co-worker step on it. The pain is improved today. She does have family history of gout in her father. Other than this, patient has no other complaints. She was hypokalemic on labs last week. She is on Losartan and Triamterine but also on HCTZ. She is no longer on potassium supplement but will need to restart at this time.      Past Medical History:   has a past medical history of Bilateral calf pain, Essential (primary) hypertension, and Hypokalemia.     Past Surgical History:   has a past surgical history that includes Bilateral tubal ligation (04/15/2001).     Family History:  family history includes Brain cancer in her mother; Heart failure in her brother; Stomach cancer in her father; Stroke in her brother and sister.     Social History:   reports that she has never smoked. She has never been exposed to tobacco smoke. She has never used smokeless tobacco. She reports that she does not currently use alcohol. She reports that she does not use drugs.     Allergies:  has No Known Allergies.     Home Medications:  Prior to Admission medications    Medication Sig Start Date End Date Taking? Authorizing Provider   amoxicillin-clavulanate 875-125mg (AUGMENTIN) 875-125 mg per tablet  Take 1 tablet by mouth 2 (two) times daily.  Patient not taking: Reported on 10/4/2023 7/18/23   Ladarius Snow Jr., FNP   baclofen (LIORESAL) 10 MG tablet   See Instructions, TAKE 1 TABLET BY MOUTH AT BEDTIME, DONT MIX WITH ALCOHOL OR ANY OTHER SEDATING MEDS. DONT OPERATE HEAVY MACHINERY, # 30 tab(s), 3 Refill(s), Pharmacy: [facility] ST[id number], 168, cm, Height/Length Dosing, 11/08/21 8:02:00 CST, 108.7, kg, W... 10/13/21   Provider, Historical   cetirizine (ZYRTEC) 10 MG tablet Take 1 tablet (10 mg total) by mouth once daily. for 14 days 7/18/23 8/1/23  Ladarius Snow Jr., FNP   clotrimazole (LOTRIMIN) 1 % cream Apply topically 2 (two) times daily. for 7 days 10/4/23 10/11/23  Cheryle Keller, LYNN   diclofenac (VOLTAREN) 50 MG EC tablet Take 1 tablet (50 mg total) by mouth 2 (two) times daily as needed (pain). 2/28/23   Miguel Russell MD   KLOR-CON M15 15 mEq tablet Take 15 mEq by mouth 2 (two) times daily. 1/2 tab 10/19/22   Provider, Historical   losartan (COZAAR) 50 MG tablet Take 1 tablet (50 mg total) by mouth once daily. 9/27/23 9/26/24  Miguel Russell MD   methylPREDNISolone (MEDROL DOSEPACK) 4 mg tablet use as directed 7/18/23   Ladarius Snow Jr., FNP   triamterene-hydrochlorothiazide 37.5-25 mg (DYAZIDE) 37.5-25 mg per capsule Take 1 capsule by mouth every morning. 10/5/23   Miguel Russell MD   vitamin D (VITAMIN D3) 1000 units Tab Take 1 tablet (1,000 Units total) by mouth once daily. 2/28/23   Miguel Russell MD       ROS:  Constitutional: no fever, fatigue, weakness  Eye: no vision loss, eye redness, drainage, or pain  ENMT:  ear pain, sinus pain/congestion, nasal congestion/drainage; dry cough and sore throat  Respiratory: no wheezing, no shortness of breath  Cardiovascular: no chest pain, no palpitations, no edema  Gastrointestinal: no nausea, vomiting, or diarrhea. No abdominal pain  Genitourinary: no dysuria, no urinary frequency or urgency, no  "hematuria  Hema/Lymph: no abnormal bruising or bleeding  Endocrine: no heat or cold intolerance, no excessive thirst or excessive urination  Musculoskeletal: no muscle or joint pain, no joint swelling; myalgias  Integumentary: no skin rash or abnormal lesion  Neurologic: no headache, no dizziness, no weakness or numbness    OBJECTIVE:     Vital signs:   /78 (BP Location: Left arm, Patient Position: Sitting, BP Method: Medium (Automatic))   Pulse 90   Temp 97.7 °F (36.5 °C) (Oral)   Resp 18   Ht 5' 5" (1.651 m)   Wt 108.2 kg (238 lb 9.6 oz)   SpO2 99%   BMI 39.71 kg/m²      Physical Examination:  Physical Exam  Constitutional:       General: She is not in acute distress.     Appearance: Normal appearance.   HENT:      Head: Normocephalic.      Right Ear: Tympanic membrane normal.      Left Ear: Tympanic membrane normal.      Mouth/Throat:      Mouth: Mucous membranes are dry.      Pharynx: Oropharynx is clear.   Eyes:      Conjunctiva/sclera: Conjunctivae normal.      Pupils: Pupils are equal, round, and reactive to light.   Cardiovascular:      Rate and Rhythm: Normal rate and regular rhythm.      Pulses: Normal pulses.      Heart sounds: No murmur heard.  Pulmonary:      Effort: Pulmonary effort is normal. No respiratory distress.   Chest:      Chest wall: No tenderness.   Abdominal:      General: Abdomen is flat. Bowel sounds are normal. There is no distension.      Palpations: Abdomen is soft.      Tenderness: There is no abdominal tenderness.   Musculoskeletal:         General: Normal range of motion.      Cervical back: Normal range of motion.   Skin:     General: Skin is warm.   Neurological:      General: No focal deficit present.      Mental Status: She is alert and oriented to person, place, and time.          Labs:  Lab Results   Component Value Date    WBC 5.58 11/07/2023    HGB 12.6 11/07/2023    HCT 39.0 11/07/2023     11/07/2023    MCV 85.5 11/07/2023    RDW 13.7 11/07/2023    Lab " Results   Component Value Date     11/07/2023    K 3.2 (L) 11/07/2023    CO2 26 11/07/2023    BUN 15.5 11/07/2023    CREATININE 0.95 11/07/2023    CALCIUM 9.7 11/07/2023    MG 2.20 05/23/2022    PHOS 2.8 05/23/2022      Lab Results   Component Value Date    HGBA1C 5.9 05/23/2022    .6 05/23/2022    CREATININE 0.95 11/07/2023    Lab Results   Component Value Date    TSH 0.7265 05/23/2022    [id number] 0.94 05/23/2022                  ASSESSMENT & PLAN:     Flu like symptoms  -Reports myalgias, dry cough, and sore throat  -Has sick contacts with children who recently diagnosed with RSV  -Will order COVID/Flu/RSV at this time  -Offered supportive care with Robatussin and Tessalon pearls  -Will continue to monitor    Rash under breast (improved)  -Suspect fungal skin rash at this time  -Will prescribe Clotrimazole cream   -Advised patient to keep skin around area clean and dry  -Will continue to monitor      Hypertension  Hypokalemia  -Well-controlled; /78 in clinic  -Patient stated that she was previously on lisinopril which gave her a terrible headache; will avoid  -Discontinued Amlodipine due to lower extremity swelling; discontinue HCTZ due to hypokalemia  -Continue on Losartan 50 mg daily at this time  -Restart potassium supplements  -K 3.2 today  -Order urine studies to assess for RTA    CKD stage 1  -Taking meloxicam chronically; asked to discontinue all NSAIDs  -Continue HTN regimen  -Will monitor    Prediabetes  -POCT A1c was 6.0  -Reports drinking soft drinks at least one per day  -Advised patient on cutting down sugary drinks like sodas and juices  -Advised patient to try to exercise 30 minutes per day 5 days per week to help control glucose  -No plans to start diabetic medications at this time  -Will continue to monitor    Hypovitaminosis D  -Patient states that she has been taking her medication and is doing well  -Vitamin D level within normal limits  -Continue cholecalciferol  daily    Health Maintenance   Topic Date Due    Mammogram  01/04/2024    Colorectal Cancer Screening  09/04/2026    Lipid Panel  05/16/2027    TETANUS VACCINE  08/20/2028    Hepatitis C Screening  Completed    Shingles Vaccine  Completed        Health Maintenance/ Wellness  Pneumococcal vaccine: Not applicable at this time  TDAP: Up-to-date (August 2018)  Influenza vaccine: UTD 11/22  Zoster/Shingrix vaccine: Not applicable at this time  Screening colonoscopy: Cologuard negative 11/2022  Pap smear: Negative 9/22  Mammogram: Negative BI-RADS1; due 1/2024      Labs ordered: CBC, CMP, urine Na/Cl/K/osmolality/pH  Imaging: N/A  Medications: reconciled, discussed and refills given.  RTC in 4 months      Miguel Russell MD  [facility] Internal Medicine, -2

## 2024-01-09 ENCOUNTER — HOSPITAL ENCOUNTER (OUTPATIENT)
Dept: RADIOLOGY | Facility: HOSPITAL | Age: 54
Discharge: HOME | End: 2024-01-09
Attending: NURSE PRACTITIONER
Payer: MEDICAID

## 2024-01-09 DIAGNOSIS — Z12.31 SCREENING MAMMOGRAM FOR BREAST CANCER: ICD-10-CM

## 2024-01-09 PROCEDURE — 77063 BREAST TOMOSYNTHESIS BI: CPT | Mod: 26,,, | Performed by: RADIOLOGY

## 2024-01-09 PROCEDURE — 77067 SCR MAMMO BI INCL CAD: CPT | Mod: TC

## 2024-01-09 PROCEDURE — 77067 SCR MAMMO BI INCL CAD: CPT | Mod: 26,,, | Performed by: RADIOLOGY

## 2024-05-08 RX ORDER — POTASSIUM CHLORIDE 750 MG/1
10 CAPSULE, EXTENDED RELEASE ORAL DAILY
Qty: 30 CAPSULE | Refills: 3 | Status: SHIPPED | OUTPATIENT
Start: 2024-05-08 | End: 2024-05-20 | Stop reason: SDUPTHER

## 2024-05-16 ENCOUNTER — LAB VISIT (OUTPATIENT)
Dept: LAB | Facility: HOSPITAL | Age: 54
End: 2024-05-16

## 2024-05-16 DIAGNOSIS — I10 HYPERTENSION, UNSPECIFIED TYPE: ICD-10-CM

## 2024-05-16 DIAGNOSIS — E87.6 HYPOKALEMIA: ICD-10-CM

## 2024-05-16 DIAGNOSIS — M79.674 PAIN OF TOE OF RIGHT FOOT: ICD-10-CM

## 2024-05-16 LAB
ALBUMIN SERPL-MCNC: 3.5 G/DL (ref 3.5–5)
ALBUMIN/GLOB SERPL: 0.9 RATIO (ref 1.1–2)
ALP SERPL-CCNC: 79 UNIT/L (ref 40–150)
ALT SERPL-CCNC: 17 UNIT/L (ref 0–55)
AST SERPL-CCNC: 19 UNIT/L (ref 5–34)
BASOPHILS # BLD AUTO: 0.02 X10(3)/MCL
BASOPHILS NFR BLD AUTO: 0.3 %
BILIRUB SERPL-MCNC: 0.3 MG/DL
BUN SERPL-MCNC: 14.7 MG/DL (ref 9.8–20.1)
CALCIUM SERPL-MCNC: 9.4 MG/DL (ref 8.4–10.2)
CHLORIDE SERPL-SCNC: 108 MMOL/L (ref 98–107)
CHLORIDE UR-SCNC: 142 MMOL/L
CO2 SERPL-SCNC: 26 MMOL/L (ref 22–29)
CREAT SERPL-MCNC: 0.85 MG/DL (ref 0.55–1.02)
EOSINOPHIL # BLD AUTO: 0.17 X10(3)/MCL (ref 0–0.9)
EOSINOPHIL NFR BLD AUTO: 2.6 %
ERYTHROCYTE [DISTWIDTH] IN BLOOD BY AUTOMATED COUNT: 13.6 % (ref 11.5–17)
GFR SERPLBLD CREATININE-BSD FMLA CKD-EPI: >60 ML/MIN/1.73/M2
GLOBULIN SER-MCNC: 3.7 GM/DL (ref 2.4–3.5)
GLUCOSE SERPL-MCNC: 94 MG/DL (ref 74–100)
HCT VFR BLD AUTO: 38.9 % (ref 37–47)
HGB BLD-MCNC: 12.7 G/DL (ref 12–16)
IMM GRANULOCYTES # BLD AUTO: 0.01 X10(3)/MCL (ref 0–0.04)
IMM GRANULOCYTES NFR BLD AUTO: 0.2 %
LYMPHOCYTES # BLD AUTO: 3.24 X10(3)/MCL (ref 0.6–4.6)
LYMPHOCYTES NFR BLD AUTO: 49.5 %
MCH RBC QN AUTO: 27.6 PG (ref 27–31)
MCHC RBC AUTO-ENTMCNC: 32.6 G/DL (ref 33–36)
MCV RBC AUTO: 84.6 FL (ref 80–94)
MONOCYTES # BLD AUTO: 0.44 X10(3)/MCL (ref 0.1–1.3)
MONOCYTES NFR BLD AUTO: 6.7 %
NEUTROPHILS # BLD AUTO: 2.67 X10(3)/MCL (ref 2.1–9.2)
NEUTROPHILS NFR BLD AUTO: 40.7 %
NRBC BLD AUTO-RTO: 0 %
OSMOLALITY UR: 478 MOSM/KG (ref 300–1300)
PLATELET # BLD AUTO: 260 X10(3)/MCL (ref 130–400)
PMV BLD AUTO: 10 FL (ref 7.4–10.4)
POTASSIUM SERPL-SCNC: 3.1 MMOL/L (ref 3.5–5.1)
POTASSIUM UR-SCNC: 13.1 MMOL/L
PROT SERPL-MCNC: 7.2 GM/DL (ref 6.4–8.3)
RBC # BLD AUTO: 4.6 X10(6)/MCL (ref 4.2–5.4)
SODIUM SERPL-SCNC: 141 MMOL/L (ref 136–145)
SODIUM UR-SCNC: 160 MMOL/L
URATE SERPL-MCNC: 6.4 MG/DL (ref 2.6–6)
WBC # SPEC AUTO: 6.55 X10(3)/MCL (ref 4.5–11.5)

## 2024-05-16 PROCEDURE — 36415 COLL VENOUS BLD VENIPUNCTURE: CPT

## 2024-05-16 PROCEDURE — 80053 COMPREHEN METABOLIC PANEL: CPT

## 2024-05-16 PROCEDURE — 84550 ASSAY OF BLOOD/URIC ACID: CPT

## 2024-05-16 PROCEDURE — 85025 COMPLETE CBC W/AUTO DIFF WBC: CPT

## 2024-05-16 PROCEDURE — 83935 ASSAY OF URINE OSMOLALITY: CPT

## 2024-05-16 PROCEDURE — 82436 ASSAY OF URINE CHLORIDE: CPT

## 2024-05-16 PROCEDURE — 84300 ASSAY OF URINE SODIUM: CPT

## 2024-05-16 PROCEDURE — 84133 ASSAY OF URINE POTASSIUM: CPT

## 2024-05-20 ENCOUNTER — OFFICE VISIT (OUTPATIENT)
Dept: INTERNAL MEDICINE | Facility: CLINIC | Age: 54
End: 2024-05-20

## 2024-05-20 VITALS
HEART RATE: 83 BPM | WEIGHT: 237.81 LBS | HEIGHT: 65 IN | DIASTOLIC BLOOD PRESSURE: 88 MMHG | TEMPERATURE: 98 F | BODY MASS INDEX: 39.62 KG/M2 | OXYGEN SATURATION: 100 % | SYSTOLIC BLOOD PRESSURE: 136 MMHG | RESPIRATION RATE: 18 BRPM

## 2024-05-20 DIAGNOSIS — E55.9 HYPOVITAMINOSIS D: ICD-10-CM

## 2024-05-20 DIAGNOSIS — E87.6 HYPOKALEMIA: Primary | ICD-10-CM

## 2024-05-20 DIAGNOSIS — Z00.00 HEALTHCARE MAINTENANCE: ICD-10-CM

## 2024-05-20 DIAGNOSIS — I10 HYPERTENSION, UNSPECIFIED TYPE: ICD-10-CM

## 2024-05-20 PROCEDURE — 99213 OFFICE O/P EST LOW 20 MIN: CPT | Mod: PBBFAC

## 2024-05-20 RX ORDER — LOSARTAN POTASSIUM 50 MG/1
50 TABLET ORAL DAILY
Qty: 90 TABLET | Refills: 3 | Status: SHIPPED | OUTPATIENT
Start: 2024-05-20 | End: 2025-05-20

## 2024-05-20 RX ORDER — CHOLECALCIFEROL (VITAMIN D3) 25 MCG
1000 TABLET ORAL DAILY
Qty: 90 TABLET | Refills: 3 | Status: SHIPPED | OUTPATIENT
Start: 2024-05-20

## 2024-05-20 RX ORDER — BACLOFEN 10 MG/1
TABLET ORAL
Qty: 30 TABLET | Refills: 3 | Status: SHIPPED | OUTPATIENT
Start: 2024-05-20

## 2024-05-20 RX ORDER — DICLOFENAC SODIUM 50 MG/1
50 TABLET, DELAYED RELEASE ORAL 2 TIMES DAILY PRN
Qty: 30 TABLET | Refills: 1 | Status: SHIPPED | OUTPATIENT
Start: 2024-05-20

## 2024-05-20 RX ORDER — POTASSIUM CHLORIDE 750 MG/1
10 CAPSULE, EXTENDED RELEASE ORAL DAILY
Qty: 30 CAPSULE | Refills: 3 | Status: SHIPPED | OUTPATIENT
Start: 2024-05-20

## 2024-05-20 RX ORDER — METOPROLOL SUCCINATE 50 MG/1
50 TABLET, EXTENDED RELEASE ORAL DAILY
Qty: 90 TABLET | Refills: 3 | Status: SHIPPED | OUTPATIENT
Start: 2024-05-20 | End: 2025-05-20

## 2024-05-20 NOTE — PROGRESS NOTES
[facility] INTERNAL MEDICINE  OUTPATIENT OFFICE VISIT NOTE       Chief Complaint: Follow-up (Patient states no acute complaints today.) and Medication Refill       HPI: Vicky Pereira is a 53 y.o. yo female with  has a past medical history of Bilateral calf pain, Essential (primary) hypertension, and Hypokalemia. Patient presented today for follow up appointment. Patient has no acute complaints today. She had labwork done which showed persistent hypokalemia. Of note, patient is still taking Triameterene-HCTZ at home for BP control. Of note, patient did have urine eletroyte studies performed as well which came back positive though patient is on the HCTZ.  Will plan to discontinue at this time and reassess after discontinuation. Will start on new BP medications for BP control as well. Will need close follow up but otherwise, patient is doing well. She is uptodate with her vaccinations and screenings as well.      Past Medical History:   has a past medical history of Bilateral calf pain, Essential (primary) hypertension, and Hypokalemia.     Past Surgical History:   has a past surgical history that includes Bilateral tubal ligation (04/15/2001).     Family History:  family history includes Brain cancer in her mother; Heart failure in her brother; Stomach cancer in her father; Stroke in her brother and sister.     Social History:   reports that she has never smoked. She has never been exposed to tobacco smoke. She has never used smokeless tobacco. She reports that she does not currently use alcohol. She reports that she does not use drugs.     Allergies:  has No Known Allergies.     Home Medications:  Prior to Admission medications    Medication Sig Start Date End Date Taking? Authorizing Provider   amoxicillin-clavulanate 875-125mg (AUGMENTIN) 875-125 mg per tablet Take 1 tablet by mouth 2 (two) times daily.  Patient not taking: Reported on 10/4/2023 7/18/23   Ladarius Snow Jr., FNP   baclofen (LIORESAL) 10 MG tablet   See  Instructions, TAKE 1 TABLET BY MOUTH AT BEDTIME, DONT MIX WITH ALCOHOL OR ANY OTHER SEDATING MEDS. DONT OPERATE HEAVY MACHINERY, # 30 tab(s), 3 Refill(s), Pharmacy: [facility] ST[id number], 168, cm, Height/Length Dosing, 11/08/21 8:02:00 CST, 108.7, kg, W... 10/13/21   Provider, Historical   cetirizine (ZYRTEC) 10 MG tablet Take 1 tablet (10 mg total) by mouth once daily. for 14 days 7/18/23 8/1/23  Ladarius Snow Jr., JAYSONP   clotrimazole (LOTRIMIN) 1 % cream Apply topically 2 (two) times daily. for 7 days 10/4/23 10/11/23  Cheryle Keller, LYNN   diclofenac (VOLTAREN) 50 MG EC tablet Take 1 tablet (50 mg total) by mouth 2 (two) times daily as needed (pain). 2/28/23   Miguel Russell MD   KLOR-CON M15 15 mEq tablet Take 15 mEq by mouth 2 (two) times daily. 1/2 tab 10/19/22   Provider, Historical   losartan (COZAAR) 50 MG tablet Take 1 tablet (50 mg total) by mouth once daily. 9/27/23 9/26/24  Miguel Russell MD   methylPREDNISolone (MEDROL DOSEPACK) 4 mg tablet use as directed 7/18/23   Ladarius Snow Jr., FNP   triamterene-hydrochlorothiazide 37.5-25 mg (DYAZIDE) 37.5-25 mg per capsule Take 1 capsule by mouth every morning. 10/5/23   Miguel Russell MD   vitamin D (VITAMIN D3) 1000 units Tab Take 1 tablet (1,000 Units total) by mouth once daily. 2/28/23   Miguel Russell MD       ROS:  Constitutional: no fever, fatigue, weakness  Eye: no vision loss, eye redness, drainage, or pain  ENMT:  ear pain, sinus pain/congestion, nasal congestion/drainage; dry cough and sore throat  Respiratory: no wheezing, no shortness of breath  Cardiovascular: no chest pain, no palpitations, no edema  Gastrointestinal: no nausea, vomiting, or diarrhea. No abdominal pain  Genitourinary: no dysuria, no urinary frequency or urgency, no hematuria  Hema/Lymph: no abnormal bruising or bleeding  Endocrine: no heat or cold intolerance, no excessive thirst or excessive urination  Musculoskeletal: no muscle or joint  "pain, no joint swelling; myalgias  Integumentary: no skin rash or abnormal lesion  Neurologic: no headache, no dizziness, no weakness or numbness    OBJECTIVE:     Vital signs:   /88 (BP Location: Left arm, Patient Position: Sitting, BP Method: Medium (Automatic))   Pulse 83   Temp 97.6 °F (36.4 °C) (Oral)   Resp 18   Ht 5' 5" (1.651 m)   Wt 107.9 kg (237 lb 12.8 oz)   SpO2 100%   BMI 39.57 kg/m²      Physical Examination:  Physical Exam  Constitutional:       General: She is not in acute distress.     Appearance: Normal appearance.   HENT:      Head: Normocephalic.      Right Ear: Tympanic membrane normal.      Left Ear: Tympanic membrane normal.      Mouth/Throat:      Mouth: Mucous membranes are moist.      Pharynx: Oropharynx is clear.   Eyes:      Conjunctiva/sclera: Conjunctivae normal.      Pupils: Pupils are equal, round, and reactive to light.   Cardiovascular:      Rate and Rhythm: Normal rate and regular rhythm.      Pulses: Normal pulses.      Heart sounds: No murmur heard.  Pulmonary:      Effort: Pulmonary effort is normal. No respiratory distress.   Chest:      Chest wall: No tenderness.   Abdominal:      General: Abdomen is flat. Bowel sounds are normal. There is no distension.      Palpations: Abdomen is soft.      Tenderness: There is no abdominal tenderness.   Musculoskeletal:         General: Normal range of motion.      Cervical back: Normal range of motion.   Skin:     General: Skin is warm.   Neurological:      General: No focal deficit present.      Mental Status: She is alert and oriented to person, place, and time.          Labs:  Lab Results   Component Value Date    WBC 6.55 05/16/2024    HGB 12.7 05/16/2024    HCT 38.9 05/16/2024     05/16/2024    MCV 84.6 05/16/2024    RDW 13.6 05/16/2024    Lab Results   Component Value Date     05/16/2024    K 3.1 (L) 05/16/2024    CO2 26 05/16/2024    BUN 14.7 05/16/2024    CREATININE 0.85 05/16/2024    CALCIUM 9.4 " 05/16/2024    MG 2.20 05/23/2022    PHOS 2.8 05/23/2022      Lab Results   Component Value Date    HGBA1C 5.9 05/23/2022    .6 05/23/2022    CREATININE 0.85 05/16/2024    Lab Results   Component Value Date    TSH 0.7265 05/23/2022    [id number] 0.94 05/23/2022                  ASSESSMENT & PLAN:     Hypertension  Hypokalemia (possible RTA type 1)  -Well-controlled; /88 in clinic  -Patient stated that she was previously on lisinopril which gave her a terrible headache; will avoid  -Discontinued Amlodipine due to lower extremity swelling; discontinue HCTZ due to hypokalemia  -Will start on Losartan 50 mg daily and Metoprolol succinate 50 mg daily at this time  -Continue potassium supplements  -K 3.1 today  -Urine electrolytes showed positive anion gap though patient on HCTZ at that time  -Will reobtain urine electrolytes in 1 month after discontinuing HCTZ to reassess for possibe RTA    CKD stage 1  -Taking meloxicam chronically; asked to discontinue all NSAIDs  -Continue HTN regimen  -Will monitor    Prediabetes  -POCT A1c was 6.0  -Reports drinking soft drinks at least one per day  -Advised patient on cutting down sugary drinks like sodas and juices  -Advised patient to try to exercise 30 minutes per day 5 days per week to help control glucose  -No plans to start diabetic medications at this time  -Will continue to monitor    Hypovitaminosis D  -Patient states that she has been taking her medication and is doing well  -Vitamin D level within normal limits  -Continue cholecalciferol daily    Health Maintenance   Topic Date Due    Mammogram  01/10/2025    Colorectal Cancer Screening  09/04/2026    Lipid Panel  05/16/2027    TETANUS VACCINE  08/20/2028    Hepatitis C Screening  Completed    Shingles Vaccine  Completed        Health Maintenance/ Wellness  Pneumococcal vaccine: Not applicable at this time  TDAP: Up-to-date (August 2018)  Influenza vaccine: UTD 9/23  Zoster/Shingrix vaccine: Not applicable  at this time  Screening colonoscopy: Cologuard negative 11/2022  Pap smear: Negative 9/22  Mammogram: Negative BI-RADS1; due 2/2025      Labs ordered: BMP; urine Na/Cl/K  Imaging: N/A  Medications: reconciled, discussed and refills given.  RTC in 4 months      Miguel Russell MD  [facility] Internal Medicine, [facility]

## 2024-07-09 ENCOUNTER — HOSPITAL ENCOUNTER (EMERGENCY)
Facility: HOSPITAL | Age: 54
Discharge: HOME | End: 2024-07-09
Attending: EMERGENCY MEDICINE
Payer: MEDICAID

## 2024-07-09 VITALS
DIASTOLIC BLOOD PRESSURE: 85 MMHG | RESPIRATION RATE: 18 BRPM | OXYGEN SATURATION: 99 % | HEART RATE: 85 BPM | WEIGHT: 238.13 LBS | TEMPERATURE: 98 F | SYSTOLIC BLOOD PRESSURE: 126 MMHG | BODY MASS INDEX: 39.62 KG/M2

## 2024-07-09 DIAGNOSIS — M62.838 MUSCLE SPASMS OF NECK: Primary | ICD-10-CM

## 2024-07-09 PROCEDURE — 25000003 PHARM REV CODE 250: Performed by: PHYSICIAN ASSISTANT

## 2024-07-09 PROCEDURE — 99284 EMERGENCY DEPT VISIT MOD MDM: CPT | Mod: 25

## 2024-07-09 PROCEDURE — 96372 THER/PROPH/DIAG INJ SC/IM: CPT | Performed by: PHYSICIAN ASSISTANT

## 2024-07-09 PROCEDURE — 63600175 PHARM REV CODE 636 W HCPCS: Performed by: PHYSICIAN ASSISTANT

## 2024-07-09 RX ORDER — KETOROLAC TROMETHAMINE 10 MG/1
10 TABLET, FILM COATED ORAL 3 TIMES DAILY PRN
Qty: 21 TABLET | Refills: 0 | Status: SHIPPED | OUTPATIENT
Start: 2024-07-09

## 2024-07-09 RX ORDER — KETOROLAC TROMETHAMINE 30 MG/ML
30 INJECTION, SOLUTION INTRAMUSCULAR; INTRAVENOUS
Status: COMPLETED | OUTPATIENT
Start: 2024-07-09 | End: 2024-07-09

## 2024-07-09 RX ORDER — METHOCARBAMOL 500 MG/1
500 TABLET, FILM COATED ORAL 3 TIMES DAILY PRN
Qty: 15 TABLET | Refills: 0 | Status: SHIPPED | OUTPATIENT
Start: 2024-07-09 | End: 2024-07-14

## 2024-07-09 RX ORDER — HYDROCODONE BITARTRATE AND ACETAMINOPHEN 5; 325 MG/1; MG/1
1 TABLET ORAL
Status: COMPLETED | OUTPATIENT
Start: 2024-07-09 | End: 2024-07-09

## 2024-07-09 RX ORDER — METHOCARBAMOL 500 MG/1
500 TABLET, FILM COATED ORAL
Status: COMPLETED | OUTPATIENT
Start: 2024-07-09 | End: 2024-07-09

## 2024-07-09 RX ADMIN — METHOCARBAMOL 500 MG: 500 TABLET ORAL at 07:07

## 2024-07-09 RX ADMIN — KETOROLAC TROMETHAMINE 30 MG: 30 INJECTION, SOLUTION INTRAMUSCULAR; INTRAVENOUS at 07:07

## 2024-07-09 RX ADMIN — HYDROCODONE BITARTRATE AND ACETAMINOPHEN 1 TABLET: 5; 325 TABLET ORAL at 07:07

## 2024-07-10 NOTE — ED PROVIDER NOTES
Encounter Date: 7/9/2024       History     Chief Complaint   Patient presents with    Neck Pain     CO PAIN TO LT NECK X 2 DAYS.  DENIES INJURY, STATES JUST WOKE UP W IT YESTERDAY AM.       54-year-old female with a history of hypertension, presents to the emergency department with complaints of  left neck pain x 2 days.  Patient states she just woke up yesterday with neck pain in his stiff neck.  Patient rates her pain 10/10.  She denies weakness, numbness, tingling, chest pain, shortness of breath.    The history is provided by the patient. No  was used.     Review of patient's allergies indicates:  No Known Allergies  Past Medical History:   Diagnosis Date    Bilateral calf pain     cramping calf pain for months    Essential (primary) hypertension     Hypokalemia      Past Surgical History:   Procedure Laterality Date    BILATERAL TUBAL LIGATION  04/15/2001     Family History   Problem Relation Name Age of Onset    Brain cancer Mother          brain tumor    Stomach cancer Father      Stroke Sister      Heart failure Brother      Stroke Brother       Social History     Tobacco Use    Smoking status: Never     Passive exposure: Never    Smokeless tobacco: Never   Substance Use Topics    Alcohol use: Not Currently    Drug use: Never     Review of Systems   Constitutional:  Negative for chills and fever.   Respiratory:  Negative for cough, chest tightness and shortness of breath.    Cardiovascular:  Negative for chest pain and palpitations.   Gastrointestinal:  Negative for abdominal pain, diarrhea, nausea and vomiting.   Musculoskeletal:  Positive for neck pain (left). Negative for back pain.   Skin:  Negative for color change, pallor, rash and wound.   Neurological:  Negative for dizziness, light-headedness, numbness and headaches.       Physical Exam     Initial Vitals [07/09/24 1731]   BP Pulse Resp Temp SpO2   126/85 84 16 97.9 °F (36.6 °C) 100 %      MAP       --         Physical  Exam    Nursing note and vitals reviewed.  Constitutional: She appears well-developed and well-nourished.   HENT:   Head: Normocephalic and atraumatic.   Nose: Nose normal.   Eyes: Conjunctivae are normal.   Neck: Neck supple.       Normal range of motion.  Cardiovascular:  Normal rate, regular rhythm, normal heart sounds and intact distal pulses.           Pulmonary/Chest: Breath sounds normal.   Abdominal: Abdomen is soft. Bowel sounds are normal. There is no abdominal tenderness. There is no rebound and no guarding.   Musculoskeletal:         General: Normal range of motion.      Cervical back: Normal range of motion and neck supple. Muscular tenderness present. No spinous process tenderness.     Neurological: She is alert and oriented to person, place, and time. GCS score is 15. GCS eye subscore is 4. GCS verbal subscore is 5. GCS motor subscore is 6.   Skin: Skin is warm. Capillary refill takes less than 2 seconds.         ED Course   Procedures  Labs Reviewed - No data to display       Imaging Results    None          Medications   HYDROcodone-acetaminophen 5-325 mg per tablet 1 tablet (1 tablet Oral Given 7/9/24 1909)   ketorolac injection 30 mg (30 mg Intramuscular Given 7/9/24 1909)   methocarbamoL tablet 500 mg (500 mg Oral Given 7/9/24 1909)     Medical Decision Making  54-year-old female with a history of hypertension, presents to the emergency department with complaints of  left neck pain x 2 days.  Patient states she just woke up yesterday with neck pain in his stiff neck.  Patient rates her pain 10/10.  She denies weakness, numbness, tingling, chest pain, shortness of breath.    DDx:  Muscle spasm, arthritis, muscle strain, osteophyte    Patient declined imaging. She is requesting trying medications at this time and will return if symptoms worsen.  I recommended heating pad and slowly stretching neck muscles.  Patient was stable for discharge.    Risk  Prescription drug management.               ED  Course as of 07/10/24 1907   Tue Jul 09, 2024   1915 The patient is resting comfortably and in no acute distress. I personally discussed her  treatment plan.  Gave strict ED precautions, discussed specific conditions for return to the emergency department and importance of follow up with her primary care provider.  Patient voices understanding and agrees to the plan discussed. All patients' questions have been answered at this time.   She has remained hemodynamically stable throughout entire stay in ED and is stable for discharge home.  [ER]      ED Course User Index  [ER] Fidelina Borrero PA                           Clinical Impression:  Final diagnoses:  [M62.838] Muscle spasms of neck (Primary)          ED Disposition Condition    Discharge Stable          ED Prescriptions       Medication Sig Dispense Start Date End Date Auth. Provider    ketorolac (TORADOL) 10 mg tablet Take 1 tablet (10 mg total) by mouth 3 (three) times daily as needed for Pain. 21 tablet 7/9/2024 -- Fidelina Borrero PA    methocarbamoL (ROBAXIN) 500 MG Tab Take 1 tablet (500 mg total) by mouth 3 (three) times daily as needed (muscle spasm). For muscle spasm 15 tablet 7/9/2024 7/14/2024 Fidelina Borrero PA          Follow-up Information       Follow up With Specialties Details Why Contact Info    [facility] - Emergency Dept Emergency Medicine  As needed, If symptoms worsen [address] [facility] [address] [phone]    Miguel Russell MD Internal Medicine Schedule an appointment as soon as possible for a visit in 2 days  [address] [facility] [address]  [phone]               Fidelina Borrero PA  07/10/24 1907

## 2024-07-10 NOTE — DISCHARGE INSTRUCTIONS
Take medications as prescribed as needed for pain with food and plenty of water.   Apply heating pad to sore muscles ( being careful not to burn skin).  Soak in warm epsom salt baths for 20-30 minutes daily to help with sore and aching muscles.  Avoid lifting heavy.  Follow up with the doctor in 2-3 days and return if symptoms worsen.

## 2024-09-24 DIAGNOSIS — Z00.00 HEALTHCARE MAINTENANCE: ICD-10-CM

## 2024-09-24 DIAGNOSIS — I10 HYPERTENSION, UNSPECIFIED TYPE: Primary | ICD-10-CM

## 2024-09-24 RX ORDER — TRIAMTERENE AND HYDROCHLOROTHIAZIDE 37.5; 25 MG/1; MG/1
1 CAPSULE ORAL EVERY MORNING
Qty: 30 CAPSULE | Refills: 3 | Status: SHIPPED | OUTPATIENT
Start: 2024-09-24

## 2024-09-24 RX ORDER — TRIAMTERENE AND HYDROCHLOROTHIAZIDE 37.5; 25 MG/1; MG/1
1 CAPSULE ORAL EVERY MORNING
COMMUNITY
Start: 2024-08-19 | End: 2024-09-24 | Stop reason: SDUPTHER

## 2024-10-09 ENCOUNTER — OFFICE VISIT (OUTPATIENT)
Dept: GYNECOLOGY | Facility: CLINIC | Age: 54
End: 2024-10-09
Payer: MEDICAID

## 2024-10-09 VITALS
SYSTOLIC BLOOD PRESSURE: 131 MMHG | WEIGHT: 235.63 LBS | OXYGEN SATURATION: 98 % | HEART RATE: 89 BPM | TEMPERATURE: 98 F | BODY MASS INDEX: 39.26 KG/M2 | RESPIRATION RATE: 18 BRPM | HEIGHT: 65 IN | DIASTOLIC BLOOD PRESSURE: 86 MMHG

## 2024-10-09 DIAGNOSIS — Z12.31 SCREENING MAMMOGRAM FOR BREAST CANCER: ICD-10-CM

## 2024-10-09 DIAGNOSIS — Z01.419 WOMEN'S ANNUAL ROUTINE GYNECOLOGICAL EXAMINATION: Primary | ICD-10-CM

## 2024-10-09 PROCEDURE — 3079F DIAST BP 80-89 MM HG: CPT | Mod: CPTII,,, | Performed by: NURSE PRACTITIONER

## 2024-10-09 PROCEDURE — 3008F BODY MASS INDEX DOCD: CPT | Mod: CPTII,,, | Performed by: NURSE PRACTITIONER

## 2024-10-09 PROCEDURE — 1159F MED LIST DOCD IN RCRD: CPT | Mod: CPTII,,, | Performed by: NURSE PRACTITIONER

## 2024-10-09 PROCEDURE — 99214 OFFICE O/P EST MOD 30 MIN: CPT | Mod: PBBFAC | Performed by: NURSE PRACTITIONER

## 2024-10-09 PROCEDURE — 3075F SYST BP GE 130 - 139MM HG: CPT | Mod: CPTII,,, | Performed by: NURSE PRACTITIONER

## 2024-10-09 PROCEDURE — 99396 PREV VISIT EST AGE 40-64: CPT | Mod: S$PBB,,, | Performed by: NURSE PRACTITIONER

## 2024-10-09 PROCEDURE — 4010F ACE/ARB THERAPY RXD/TAKEN: CPT | Mod: CPTII,,, | Performed by: NURSE PRACTITIONER

## 2024-10-09 PROCEDURE — 1160F RVW MEDS BY RX/DR IN RCRD: CPT | Mod: CPTII,,, | Performed by: NURSE PRACTITIONER

## 2024-10-09 RX ORDER — CLOTRIMAZOLE 1 %
CREAM (GRAM) TOPICAL 2 TIMES DAILY
Qty: 1 EACH | Refills: 0 | Status: SHIPPED | OUTPATIENT
Start: 2024-10-09 | End: 2024-10-16

## 2024-10-09 NOTE — PROGRESS NOTES
"Patient ID: Vicky Pereira is a 54 y.o. female.    Chief Complaint: Gynecologic Exam      Review of patient's allergies indicates:  No Known Allergies        HPI:  Pt is  here for annual gyn exam. Denies hx of abnormal pap.Pt is postmenopausal. Denies vaginal bleeding/discharge .Denies pain. Denies fly hx of breast, ovarian, or uterine cancer. Pt is followed in medicine clinic for primary care. Last pap 2022-NIL; HPV 16/18/45.  Pt requesting refill of clotrimazole to use as needed for occasional flare up of candidiasis under breast.   Review of Systems:   Negative except for findings in HPI     Objective:   /86   Pulse 89   Temp 97.9 °F (36.6 °C) (Oral)   Resp 18   Ht 5' 5" (1.651 m)   Wt 106.9 kg (235 lb 9.6 oz)   LMP 2022   SpO2 98%   BMI 39.21 kg/m²    Physical Exam:  GENERAL: Pt is aware and alert and  in no acute distress.  BREASTS: Bilateral-No masses, nipple discharge, skin changes, or tenderness.   ABDOMEN: Soft, non tender.  VULVA:  No lesions or skin changes.  URETHRA: No lesions  BLADDER: No tenderness.  VAGINA: Mucosa pale,no discharge; no lesions.  CERVIX:  no CMT, NO discharge; NO lesions  BIMANUAL EXAM:  The uterus is mobile, nontender, no palpable masses. Obdulio adnexa reveal no evidence of masses; no fullness   SKIN: Warm and Dry  PSYCHIATRIC: Patient is awake and alert. Mood and affect are normal    Assessment:   Women's annual routine gynecological examination    Screening mammogram for breast cancer  -     Mammo Digital Screening Bilat w/ Miles; Future; Expected date: 2025    Other orders  -     clotrimazole (LOTRIMIN) 1 % cream; Apply topically 2 (two) times daily. for 7 days  Dispense: 1 each; Refill: 0            1. Women's annual routine gynecological examination    2. Screening mammogram for breast cancer             -pelvic; pap UTD per ACOG guidelines  -declined sti screening  Plan:       Follow up in about 1 year (around 10/9/2025).    "

## 2024-10-21 RX ORDER — POTASSIUM CHLORIDE 750 MG/1
10 CAPSULE, EXTENDED RELEASE ORAL DAILY
Qty: 30 CAPSULE | Refills: 3 | Status: SHIPPED | OUTPATIENT
Start: 2024-10-21

## 2024-10-26 ENCOUNTER — HOSPITAL ENCOUNTER (EMERGENCY)
Facility: HOSPITAL | Age: 54
Discharge: HOME | End: 2024-10-26
Attending: EMERGENCY MEDICINE
Payer: MEDICAID

## 2024-10-26 VITALS
BODY MASS INDEX: 40.65 KG/M2 | DIASTOLIC BLOOD PRESSURE: 79 MMHG | HEIGHT: 64 IN | RESPIRATION RATE: 18 BRPM | TEMPERATURE: 98 F | OXYGEN SATURATION: 97 % | WEIGHT: 238.13 LBS | SYSTOLIC BLOOD PRESSURE: 117 MMHG | HEART RATE: 90 BPM

## 2024-10-26 DIAGNOSIS — J06.9 UPPER RESPIRATORY TRACT INFECTION, UNSPECIFIED TYPE: Primary | ICD-10-CM

## 2024-10-26 LAB
FLUAV AG UPPER RESP QL IA.RAPID: NOT DETECTED
FLUBV AG UPPER RESP QL IA.RAPID: NOT DETECTED
RSV A 5' UTR RNA NPH QL NAA+PROBE: NOT DETECTED
SARS-COV-2 RNA RESP QL NAA+PROBE: NOT DETECTED
STREP A PCR (OHS): NOT DETECTED

## 2024-10-26 PROCEDURE — 99283 EMERGENCY DEPT VISIT LOW MDM: CPT

## 2024-10-26 PROCEDURE — 87651 STREP A DNA AMP PROBE: CPT | Performed by: NURSE PRACTITIONER

## 2024-10-26 PROCEDURE — 0241U COVID/RSV/FLU A&B PCR: CPT | Performed by: NURSE PRACTITIONER

## 2024-10-26 RX ORDER — PROMETHAZINE HYDROCHLORIDE AND DEXTROMETHORPHAN HYDROBROMIDE 6.25; 15 MG/5ML; MG/5ML
5 SYRUP ORAL EVERY 4 HOURS PRN
Qty: 120 ML | Refills: 0 | Status: SHIPPED | OUTPATIENT
Start: 2024-10-26 | End: 2024-11-05

## 2024-10-28 ENCOUNTER — LAB VISIT (OUTPATIENT)
Dept: LAB | Facility: HOSPITAL | Age: 54
End: 2024-10-28
Payer: MEDICAID

## 2024-10-28 DIAGNOSIS — E87.6 HYPOKALEMIA: ICD-10-CM

## 2024-10-28 LAB
ANION GAP SERPL CALC-SCNC: 9 MEQ/L
BUN SERPL-MCNC: 12.9 MG/DL (ref 9.8–20.1)
CALCIUM SERPL-MCNC: 9.6 MG/DL (ref 8.4–10.2)
CHLORIDE SERPL-SCNC: 107 MMOL/L (ref 98–107)
CHLORIDE UR-SCNC: 83 MMOL/L
CO2 SERPL-SCNC: 25 MMOL/L (ref 22–29)
CREAT SERPL-MCNC: 0.89 MG/DL (ref 0.55–1.02)
CREAT/UREA NIT SERPL: 14
GFR SERPLBLD CREATININE-BSD FMLA CKD-EPI: >60 ML/MIN/1.73/M2
GLUCOSE SERPL-MCNC: 141 MG/DL (ref 74–100)
POTASSIUM SERPL-SCNC: 4.2 MMOL/L (ref 3.5–5.1)
POTASSIUM UR-SCNC: 121.3 MMOL/L
SODIUM SERPL-SCNC: 141 MMOL/L (ref 136–145)
SODIUM UR-SCNC: 61 MMOL/L

## 2024-10-28 PROCEDURE — 36415 COLL VENOUS BLD VENIPUNCTURE: CPT

## 2024-10-28 PROCEDURE — 80048 BASIC METABOLIC PNL TOTAL CA: CPT

## 2024-10-28 PROCEDURE — 84133 ASSAY OF URINE POTASSIUM: CPT

## 2024-10-28 PROCEDURE — 82436 ASSAY OF URINE CHLORIDE: CPT

## 2024-10-28 PROCEDURE — 84300 ASSAY OF URINE SODIUM: CPT

## 2024-10-29 ENCOUNTER — OFFICE VISIT (OUTPATIENT)
Dept: INTERNAL MEDICINE | Facility: CLINIC | Age: 54
End: 2024-10-29
Payer: MEDICAID

## 2024-10-29 VITALS
RESPIRATION RATE: 18 BRPM | BODY MASS INDEX: 39.98 KG/M2 | OXYGEN SATURATION: 98 % | HEIGHT: 64 IN | HEART RATE: 83 BPM | SYSTOLIC BLOOD PRESSURE: 124 MMHG | WEIGHT: 234.19 LBS | DIASTOLIC BLOOD PRESSURE: 76 MMHG | TEMPERATURE: 98 F

## 2024-10-29 DIAGNOSIS — I10 HYPERTENSION, UNSPECIFIED TYPE: Primary | ICD-10-CM

## 2024-10-29 DIAGNOSIS — E87.6 HYPOKALEMIA: ICD-10-CM

## 2024-10-29 DIAGNOSIS — R73.03 PREDIABETES: ICD-10-CM

## 2024-10-29 DIAGNOSIS — E55.9 HYPOVITAMINOSIS D: ICD-10-CM

## 2024-10-29 PROCEDURE — 99213 OFFICE O/P EST LOW 20 MIN: CPT | Mod: PBBFAC

## 2024-10-29 RX ORDER — CEFDINIR 300 MG/1
300 CAPSULE ORAL 2 TIMES DAILY
COMMUNITY

## 2024-10-29 RX ORDER — CHOLECALCIFEROL (VITAMIN D3) 25 MCG
1000 TABLET ORAL DAILY
Qty: 90 TABLET | Refills: 3 | Status: SHIPPED | OUTPATIENT
Start: 2024-10-29

## 2024-10-29 RX ORDER — PREDNISONE 20 MG/1
20 TABLET ORAL DAILY
COMMUNITY

## 2024-10-29 RX ORDER — TRIAMTERENE CAPSULES 50 MG/1
50 CAPSULE ORAL DAILY
Qty: 30 CAPSULE | Refills: 11 | Status: SHIPPED | OUTPATIENT
Start: 2024-10-29 | End: 2025-10-29

## 2024-12-31 DIAGNOSIS — I10 HYPERTENSION, UNSPECIFIED TYPE: ICD-10-CM

## 2024-12-31 RX ORDER — TRIAMTERENE CAPSULES 50 MG/1
50 CAPSULE ORAL DAILY
Qty: 30 CAPSULE | Refills: 11 | Status: SHIPPED | OUTPATIENT
Start: 2024-12-31 | End: 2025-12-31

## 2025-01-16 ENCOUNTER — HOSPITAL ENCOUNTER (OUTPATIENT)
Dept: RADIOLOGY | Facility: HOSPITAL | Age: 55
Discharge: HOME | End: 2025-01-16
Attending: NURSE PRACTITIONER
Payer: MEDICAID

## 2025-01-16 DIAGNOSIS — Z12.31 SCREENING MAMMOGRAM FOR BREAST CANCER: ICD-10-CM

## 2025-01-16 PROCEDURE — 77063 BREAST TOMOSYNTHESIS BI: CPT | Mod: 26,,, | Performed by: RADIOLOGY

## 2025-01-16 PROCEDURE — 77067 SCR MAMMO BI INCL CAD: CPT | Mod: 26,,, | Performed by: RADIOLOGY

## 2025-01-16 PROCEDURE — 77067 SCR MAMMO BI INCL CAD: CPT | Mod: TC

## 2025-02-13 ENCOUNTER — HOSPITAL ENCOUNTER (EMERGENCY)
Facility: HOSPITAL | Age: 55
Discharge: HOME | End: 2025-02-13
Attending: INTERNAL MEDICINE
Payer: MEDICAID

## 2025-02-13 VITALS
RESPIRATION RATE: 20 BRPM | HEART RATE: 102 BPM | BODY MASS INDEX: 38.93 KG/M2 | HEIGHT: 65 IN | OXYGEN SATURATION: 99 % | SYSTOLIC BLOOD PRESSURE: 171 MMHG | DIASTOLIC BLOOD PRESSURE: 79 MMHG | WEIGHT: 233.69 LBS | TEMPERATURE: 100 F

## 2025-02-13 DIAGNOSIS — B34.9 VIRAL SYNDROME: ICD-10-CM

## 2025-02-13 DIAGNOSIS — Z20.828 EXPOSURE TO THE FLU: Primary | ICD-10-CM

## 2025-02-13 DIAGNOSIS — I10 UNCONTROLLED HYPERTENSION: ICD-10-CM

## 2025-02-13 PROCEDURE — 87651 STREP A DNA AMP PROBE: CPT | Performed by: INTERNAL MEDICINE

## 2025-02-13 PROCEDURE — 25000003 PHARM REV CODE 250: Performed by: INTERNAL MEDICINE

## 2025-02-13 PROCEDURE — 63600175 PHARM REV CODE 636 W HCPCS: Mod: JZ,TB | Performed by: INTERNAL MEDICINE

## 2025-02-13 PROCEDURE — 96372 THER/PROPH/DIAG INJ SC/IM: CPT | Performed by: INTERNAL MEDICINE

## 2025-02-13 PROCEDURE — 0241U COVID/RSV/FLU A&B PCR: CPT | Performed by: INTERNAL MEDICINE

## 2025-02-13 PROCEDURE — 99284 EMERGENCY DEPT VISIT MOD MDM: CPT | Mod: 25

## 2025-02-13 RX ORDER — OSELTAMIVIR PHOSPHATE 75 MG/1
75 CAPSULE ORAL 2 TIMES DAILY
Qty: 10 CAPSULE | Refills: 0 | Status: SHIPPED | OUTPATIENT
Start: 2025-02-13 | End: 2025-02-20

## 2025-02-13 RX ORDER — KETOROLAC TROMETHAMINE 30 MG/ML
30 INJECTION, SOLUTION INTRAMUSCULAR; INTRAVENOUS
Status: COMPLETED | OUTPATIENT
Start: 2025-02-13 | End: 2025-02-13

## 2025-02-13 RX ORDER — ACETAMINOPHEN 500 MG
1000 TABLET ORAL
Status: COMPLETED | OUTPATIENT
Start: 2025-02-13 | End: 2025-02-13

## 2025-02-13 RX ORDER — DICLOFENAC SODIUM 50 MG/1
50 TABLET, DELAYED RELEASE ORAL 2 TIMES DAILY PRN
Qty: 20 TABLET | Refills: 0 | Status: SHIPPED | OUTPATIENT
Start: 2025-02-13

## 2025-02-13 RX ADMIN — KETOROLAC TROMETHAMINE 30 MG: 30 INJECTION, SOLUTION INTRAMUSCULAR at 07:02

## 2025-02-13 RX ADMIN — ACETAMINOPHEN 1000 MG: 500 TABLET, FILM COATED ORAL at 05:02

## 2025-02-13 NOTE — ED PROVIDER NOTES
Encounter Date: 2/13/2025       History     Chief Complaint   Patient presents with    Sore Throat    Cough    Generalized Body Aches     Flu-like symptoms (cough, headache, sore throat, and fever) since yesterday. Current temp=38.2. suhail     Presents with fever, chills, malaise, sore throat and headache for the last day after influenza exposure as work. 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 Hx of HTN    The history is provided by the patient.     Review of patient's allergies indicates:  No Known Allergies  Past Medical History:   Diagnosis Date    Bilateral calf pain     cramping calf pain for months    Essential (primary) hypertension     Hypokalemia      Past Surgical History:   Procedure Laterality Date    BILATERAL TUBAL LIGATION  04/15/2001     Family History   Problem Relation Name Age of Onset    Brain cancer Mother          brain tumor    Stomach cancer Father      Stroke Sister      Heart failure Brother      Stroke Brother       Social History     Tobacco Use    Smoking status: Never     Passive exposure: Never    Smokeless tobacco: Never   Substance Use Topics    Alcohol use: Not Currently    Drug use: Never     Review of Systems   Constitutional:  Positive for chills and fever.   HENT:  Positive for sore throat.    Musculoskeletal:  Positive for myalgias.   Neurological:  Positive for headaches.       Physical Exam     Initial Vitals [02/13/25 1734]   BP Pulse Resp Temp SpO2   (!) 171/79 102 20 (!) 100.8 °F (38.2 °C) 99 %      MAP       --         Physical Exam    Nursing note and vitals reviewed.  Constitutional: She appears well-developed and well-nourished.   HENT:   Head: Normocephalic and atraumatic. Mouth/Throat: Oropharynx is clear and moist. No oropharyngeal exudate.   Eyes: Conjunctivae and EOM are normal. Pupils are equal, round, and reactive to light.   Neck: Neck supple. No tracheal deviation present. No JVD present.   Normal range of motion.  Cardiovascular:   Normal rate, regular rhythm, normal heart sounds and intact distal pulses.           Pulmonary/Chest: Breath sounds normal. No stridor.   Abdominal: Abdomen is soft. Bowel sounds are normal. She exhibits no distension. There is no abdominal tenderness. There is no rebound and no guarding.   Musculoskeletal:         General: No edema. Normal range of motion.      Cervical back: Normal range of motion and neck supple.     Lymphadenopathy:     She has no cervical adenopathy.   Neurological: She is alert and oriented to person, place, and time. She has normal strength. GCS score is 15. GCS eye subscore is 4. GCS verbal subscore is 5. GCS motor subscore is 6.   Skin: Skin is warm and dry. No rash noted.   Psychiatric: Her behavior is normal. Thought content normal.         ED Course   Procedures  Labs Reviewed   COVID/RSV/FLU A&B PCR - Normal       Result Value    Influenza A PCR Not Detected      Influenza B PCR Not Detected      Respiratory Syncytial Virus PCR Not Detected      SARS-CoV-2 PCR Not Detected      Narrative:     The Xpert Xpress SARS-CoV-2/FLU/RSV plus is a rapid, multiplexed real-time PCR test intended for the simultaneous qualitative detection and differentiation of SARS-CoV-2, Influenza A, Influenza B, and respiratory syncytial virus (RSV) viral RNA in either nasopharyngeal swab or nasal swab specimens.         STREP GROUP A BY PCR - Normal    STREP A PCR ([facility]) Not Detected      Narrative:     The Xpert Xpress Strep A test is a rapid, qualitative in vitro diagnostic test for the detection of Streptococcus pyogenes (Group A ß-hemolytic Streptococcus, Strep A) in throat swab specimens from patients with signs and symptoms of pharyngitis.            Imaging Results    None          Medications   acetaminophen tablet 1,000 mg (1,000 mg Oral Given 2/13/25 1754)   ketorolac injection 30 mg (30 mg Intramuscular Given 2/13/25 1900)     Medical Decision Making  Amount and/or Complexity of Data Reviewed  Labs:  ordered. Decision-making details documented in ED Course.    Risk  OTC drugs.  Prescription drug management.      Additional MDM:   Differential Diagnosis:   Other: The following diagnoses were also considered and will be evaluated: Flu, Strep and URI.                                   Clinical Impression:  Final diagnoses:  [Z20.828] Exposure to the flu (Primary)  [B34.9] Viral syndrome  [I10] Uncontrolled hypertension          ED Disposition Condition    Discharge Fair          ED Prescriptions       Medication Sig Dispense Start Date End Date Auth. Provider    oseltamivir (TAMIFLU) 75 MG capsule Take 1 capsule (75 mg total) by mouth 2 (two) times daily. for 5 days 10 capsule 2/13/2025 2/18/2025 Biju Plascencia MD    diclofenac (VOLTAREN) 50 MG EC tablet Take 1 tablet (50 mg total) by mouth 2 (two) times daily as needed. 20 tablet 2/13/2025 -- Biju Plascencia MD    benzocaine-menthoL 6-10 mg lozenge Take 1 lozenge by mouth every 2 (two) hours as needed for Pain (sore throat). 18 tablet 2/13/2025 -- Biju Plascencia MD          Follow-up Information       Follow up With Specialties Details Why Contact Info    Miguel Russell MD Internal Medicine Schedule an appointment as soon as possible for a visit in 2 weeks  [address] [facility] [address]  [phone]      [facility] Emergency Medicine  If symptoms worsen [address] [facility] [id number] [phone]             Biju Plascencia MD  02/13/25 2020

## 2025-02-13 NOTE — Clinical Note
"Vicky"REAGAN Pereira was seen and treated in our emergency department on 2/13/2025.  She may return to work on 02/19/2025.       If you have any questions or concerns, please don't hesitate to call.      Biju Plascencia MD"

## 2025-02-17 ENCOUNTER — LAB VISIT (OUTPATIENT)
Dept: LAB | Facility: HOSPITAL | Age: 55
End: 2025-02-17
Payer: MEDICAID

## 2025-02-17 DIAGNOSIS — E87.6 HYPOKALEMIA: ICD-10-CM

## 2025-02-17 DIAGNOSIS — E55.9 HYPOVITAMINOSIS D: ICD-10-CM

## 2025-02-17 LAB
25(OH)D3+25(OH)D2 SERPL-MCNC: 34 NG/ML (ref 30–80)
ANION GAP SERPL CALC-SCNC: 6 MEQ/L
BUN SERPL-MCNC: 12.4 MG/DL (ref 9.8–20.1)
CALCIUM SERPL-MCNC: 9.4 MG/DL (ref 8.4–10.2)
CHLORIDE SERPL-SCNC: 109 MMOL/L (ref 98–107)
CHLORIDE UR-SCNC: 68 MMOL/L
CO2 SERPL-SCNC: 29 MMOL/L (ref 22–29)
CREAT SERPL-MCNC: 0.96 MG/DL (ref 0.55–1.02)
CREAT/UREA NIT SERPL: 13
GFR SERPLBLD CREATININE-BSD FMLA CKD-EPI: >60 ML/MIN/1.73/M2
GLUCOSE SERPL-MCNC: 93 MG/DL (ref 74–100)
POTASSIUM SERPL-SCNC: 4.2 MMOL/L (ref 3.5–5.1)
POTASSIUM UR-SCNC: 55.3 MMOL/L
SODIUM SERPL-SCNC: 144 MMOL/L (ref 136–145)
SODIUM UR-SCNC: 52 MMOL/L

## 2025-02-17 PROCEDURE — 36415 COLL VENOUS BLD VENIPUNCTURE: CPT

## 2025-02-17 PROCEDURE — 82306 VITAMIN D 25 HYDROXY: CPT

## 2025-02-17 PROCEDURE — 84133 ASSAY OF URINE POTASSIUM: CPT

## 2025-02-17 PROCEDURE — 82436 ASSAY OF URINE CHLORIDE: CPT

## 2025-02-17 PROCEDURE — 80048 BASIC METABOLIC PNL TOTAL CA: CPT

## 2025-02-17 PROCEDURE — 84300 ASSAY OF URINE SODIUM: CPT

## 2025-02-20 ENCOUNTER — OFFICE VISIT (OUTPATIENT)
Dept: INTERNAL MEDICINE | Facility: CLINIC | Age: 55
End: 2025-02-20
Payer: MEDICAID

## 2025-02-20 VITALS
RESPIRATION RATE: 18 BRPM | DIASTOLIC BLOOD PRESSURE: 85 MMHG | HEART RATE: 88 BPM | OXYGEN SATURATION: 100 % | WEIGHT: 230 LBS | BODY MASS INDEX: 38.32 KG/M2 | HEIGHT: 65 IN | TEMPERATURE: 98 F | SYSTOLIC BLOOD PRESSURE: 136 MMHG

## 2025-02-20 DIAGNOSIS — Z00.00 HEALTHCARE MAINTENANCE: ICD-10-CM

## 2025-02-20 DIAGNOSIS — R73.03 PREDIABETES: ICD-10-CM

## 2025-02-20 DIAGNOSIS — I10 HYPERTENSION, UNSPECIFIED TYPE: Primary | ICD-10-CM

## 2025-02-20 PROBLEM — E87.6 HYPOKALEMIA: Status: RESOLVED | Noted: 2022-11-07 | Resolved: 2025-02-20

## 2025-02-20 PROCEDURE — 99213 OFFICE O/P EST LOW 20 MIN: CPT | Mod: PBBFAC

## 2025-02-20 RX ORDER — TRIAMTERENE CAPSULES 50 MG/1
50 CAPSULE ORAL DAILY
Qty: 30 CAPSULE | Refills: 11 | Status: SHIPPED | OUTPATIENT
Start: 2025-02-20 | End: 2026-02-20

## 2025-02-20 NOTE — PROGRESS NOTES
[facility] INTERNAL MEDICINE  OUTPATIENT OFFICE VISIT NOTE       Chief Complaint: Follow-up, Medication Refill, and Knee Pain       HPI: Vicky Pereira is a 54 y.o. yo female with  has a past medical history of Bilateral calf pain, Essential (primary) hypertension, and Hypokalemia. Patient presented today for follow up appointment. Patient doing well this visit. She has stopped taking her potassium supplement and repeat blood work shows good levels. Pressures remain controlled on triamterere. Otherwise, patient has no other complaints today. She is due for flu vaccine and pneumococcal vaccine but wants to think about getting them first. Will attempt to offer again next visit.     Past Medical History:   has a past medical history of Bilateral calf pain, Essential (primary) hypertension, and Hypokalemia.     Past Surgical History:   has a past surgical history that includes Bilateral tubal ligation (04/15/2001).     Family History:  family history includes Brain cancer in her mother; Heart failure in her brother; Stomach cancer in her father; Stroke in her brother and sister.     Social History:   reports that she has never smoked. She has never been exposed to tobacco smoke. She has never used smokeless tobacco. She reports that she does not currently use alcohol. She reports that she does not use drugs.     Allergies:  has no known allergies.     Home Medications:  Prior to Admission medications    Medication Sig Start Date End Date Taking? Authorizing Provider   amoxicillin-clavulanate 875-125mg (AUGMENTIN) 875-125 mg per tablet Take 1 tablet by mouth 2 (two) times daily.  Patient not taking: Reported on 10/4/2023 7/18/23   Ladarius Snow Jr., FNP   baclofen (LIORESAL) 10 MG tablet   See Instructions, TAKE 1 TABLET BY MOUTH AT BEDTIME, DONT MIX WITH ALCOHOL OR ANY OTHER SEDATING MEDS. DONT OPERATE HEAVY MACHINERY, # 30 tab(s), 3 Refill(s), Pharmacy: [facility] STORE [id number], 168, cm, Height/Length Dosing, 11/08/21 8:02:00 CST,  108.7, kg, W... 10/13/21   Provider, Historical   cetirizine (ZYRTEC) 10 MG tablet Take 1 tablet (10 mg total) by mouth once daily. for 14 days 7/18/23 8/1/23  Ladarius Snow Jr., JAYSONP   clotrimazole (LOTRIMIN) 1 % cream Apply topically 2 (two) times daily. for 7 days 10/4/23 10/11/23  Cheryle Keller, LYNN   diclofenac (VOLTAREN) 50 MG EC tablet Take 1 tablet (50 mg total) by mouth 2 (two) times daily as needed (pain). 2/28/23   Miguel Russell MD   KLOR-CON M15 15 mEq tablet Take 15 mEq by mouth 2 (two) times daily. 1/2 tab 10/19/22   Provider, Historical   losartan (COZAAR) 50 MG tablet Take 1 tablet (50 mg total) by mouth once daily. 9/27/23 9/26/24  Miguel Russell MD   methylPREDNISolone (MEDROL DOSEPACK) 4 mg tablet use as directed 7/18/23   Ladarius Snow Jr., FNP   triamterene-hydrochlorothiazide 37.5-25 mg (DYAZIDE) 37.5-25 mg per capsule Take 1 capsule by mouth every morning. 10/5/23   Miguel Russell MD   vitamin D (VITAMIN D3) 1000 units Tab Take 1 tablet (1,000 Units total) by mouth once daily. 2/28/23   Miguel Russell MD       ROS:  Constitutional: no fever, fatigue, weakness  Eye: no vision loss, eye redness, drainage, or pain  ENMT:  ear pain, sinus pain/congestion, nasal congestion/drainage; dry cough and sore throat  Respiratory: no wheezing, no shortness of breath  Cardiovascular: no chest pain, no palpitations, no edema  Gastrointestinal: no nausea, vomiting, or diarrhea. No abdominal pain  Genitourinary: no dysuria, no urinary frequency or urgency, no hematuria  Hema/Lymph: no abnormal bruising or bleeding  Endocrine: no heat or cold intolerance, no excessive thirst or excessive urination  Musculoskeletal: no muscle or joint pain, no joint swelling; myalgias  Integumentary: no skin rash or abnormal lesion  Neurologic: no headache, no dizziness, no weakness or numbness    OBJECTIVE:     Vital signs:   /85 (BP Location: Left arm, Patient Position: Sitting)  "  Pulse 88   Temp 97.8 °F (36.6 °C) (Oral)   Resp 18   Ht 5' 5" (1.651 m)   Wt 104.3 kg (230 lb)   LMP 04/18/2022   SpO2 100%   BMI 38.27 kg/m²      Physical Examination:  Physical Exam  Constitutional:       General: She is not in acute distress.     Appearance: Normal appearance.   HENT:      Head: Normocephalic.      Right Ear: Tympanic membrane normal.      Left Ear: Tympanic membrane normal.      Mouth/Throat:      Mouth: Mucous membranes are moist.      Pharynx: Oropharynx is clear.   Eyes:      Conjunctiva/sclera: Conjunctivae normal.      Pupils: Pupils are equal, round, and reactive to light.   Cardiovascular:      Rate and Rhythm: Normal rate and regular rhythm.      Pulses: Normal pulses.      Heart sounds: No murmur heard.  Pulmonary:      Effort: Pulmonary effort is normal. No respiratory distress.   Chest:      Chest wall: No tenderness.   Abdominal:      General: Abdomen is flat. Bowel sounds are normal. There is no distension.      Palpations: Abdomen is soft.      Tenderness: There is no abdominal tenderness.   Musculoskeletal:         General: Normal range of motion.      Cervical back: Normal range of motion.   Skin:     General: Skin is warm.   Neurological:      General: No focal deficit present.      Mental Status: She is alert and oriented to person, place, and time.          Labs:  Lab Results   Component Value Date    WBC 6.55 05/16/2024    HGB 12.7 05/16/2024    HCT 38.9 05/16/2024     05/16/2024    MCV 84.6 05/16/2024    RDW 13.6 05/16/2024    Lab Results   Component Value Date     02/17/2025    K 4.2 02/17/2025     (H) 02/17/2025    CO2 29 02/17/2025    BUN 12.4 02/17/2025    CREATININE 0.96 02/17/2025    CALCIUM 9.4 02/17/2025    MG 2.20 05/23/2022    PHOS 2.8 05/23/2022      Lab Results   Component Value Date    HGBA1C 5.9 05/23/2022    .6 05/23/2022    CREATININE 0.96 02/17/2025    Lab Results   Component Value Date    TSH 0.7265 05/23/2022    " [id number] 0.94 05/23/2022                  ASSESSMENT & PLAN:     Hypertension  Hypokalemia (resolved)  -Well-controlled; /85 in clinic  -Patient stated that she was previously on lisinopril which gave her a terrible headache; will avoid  -Discontinued Amlodipine due to lower extremity swelling; discontinued HCTZ due to hypokalemia; disconitnued Losartan   -Will continue Triamterene due to hypokalemia    CKD stage 1  -Taking meloxicam chronically; asked to discontinue all NSAIDs  -Continue HTN regimen  -Will monitor    Prediabetes  -POCT A1c was 6.0  -Reports drinking soft drinks at least one per day  -Advised patient on cutting down sugary drinks like sodas and juices  -Advised patient to try to exercise 30 minutes per day 5 days per week to help control glucose  -No plans to start diabetic medications at this time  -Repeat A1c ordered    Hypovitaminosis D  -Patient states that she has been taking her medication and is doing well  -Vitamin D level within normal limits  -Continue cholecalciferol daily    Health Maintenance   Topic Date Due    Pneumococcal Vaccines (Age 50+) (1 of 1 - PCV) Never done    Hemoglobin A1c (Prediabetes)  05/23/2024    Influenza Vaccine (1) 09/01/2024    COVID-19 Vaccine (4 - 2024-25 season) 09/01/2024    Mammogram  01/17/2026    Colorectal Cancer Screening  09/04/2026    Lipid Panel  05/16/2027    Cervical Cancer Screening  09/29/2027    TETANUS VACCINE  08/20/2028    RSV Vaccine (Age 60+ and Pregnant patients) (1 - 1-dose 75+ series) 07/02/2044    Hepatitis C Screening  Completed    Shingles Vaccine  Completed    HIV Screening  Completed        Health Maintenance/ Wellness  Pneumococcal vaccine: Offer next visit  TDAP: Up-to-date (August 2018)  Influenza vaccine: Refused  Zoster/Shingrix vaccine: UTD (1st dose 10/2020 and 2nd dose 12/2020)  Screening colonoscopy: Cologuard negative 11/2023; repeat in 9/2026  Pap smear: Negative 9/22  Mammogram: UTD 1/2025; repeat 1/2026      Labs  ordered: A1c  Imaging: N/A  Medications: reconciled, discussed and refills given.  RTC in 6 months      Miguel Russell MD  [facility] Internal Medicine, -3

## 2025-02-20 NOTE — PROGRESS NOTES
I have reviewed and agree with the resident's findings, including all diagnostic interpretations and plans as written.    Teresa Wilson MD

## 2025-03-07 RX ORDER — POTASSIUM CHLORIDE 750 MG/1
10 CAPSULE, EXTENDED RELEASE ORAL DAILY
Qty: 30 CAPSULE | Refills: 2 | Status: CANCELLED | OUTPATIENT
Start: 2025-03-07

## 2025-03-07 RX ORDER — POTASSIUM CHLORIDE 750 MG/1
10 CAPSULE, EXTENDED RELEASE ORAL DAILY
COMMUNITY
Start: 2025-02-01 | End: 2025-03-08

## 2025-04-06 ENCOUNTER — HOSPITAL ENCOUNTER (EMERGENCY)
Facility: HOSPITAL | Age: 55
Discharge: HOME | End: 2025-04-06
Attending: INTERNAL MEDICINE
Payer: MEDICAID

## 2025-04-06 VITALS
BODY MASS INDEX: 38.31 KG/M2 | WEIGHT: 230.19 LBS | HEART RATE: 85 BPM | OXYGEN SATURATION: 99 % | SYSTOLIC BLOOD PRESSURE: 148 MMHG | TEMPERATURE: 99 F | RESPIRATION RATE: 18 BRPM | DIASTOLIC BLOOD PRESSURE: 90 MMHG

## 2025-04-06 DIAGNOSIS — H61.22 IMPACTED CERUMEN OF LEFT EAR: ICD-10-CM

## 2025-04-06 DIAGNOSIS — U07.1 COVID: Primary | ICD-10-CM

## 2025-04-06 LAB
FLUAV AG UPPER RESP QL IA.RAPID: NOT DETECTED
FLUBV AG UPPER RESP QL IA.RAPID: NOT DETECTED
SARS-COV-2 RNA RESP QL NAA+PROBE: DETECTED
STREP A PCR (OHS): NOT DETECTED

## 2025-04-06 PROCEDURE — 0240U COVID/FLU A&B PCR: CPT | Performed by: INTERNAL MEDICINE

## 2025-04-06 PROCEDURE — 87651 STREP A DNA AMP PROBE: CPT | Performed by: INTERNAL MEDICINE

## 2025-04-06 PROCEDURE — 99283 EMERGENCY DEPT VISIT LOW MDM: CPT

## 2025-04-06 RX ORDER — DEXTROMETHORPHAN HYDROBROMIDE AND GUAIFENESIN 10; 200 MG/1; MG/1
1 CAPSULE, GELATIN COATED ORAL EVERY 4 HOURS PRN
Qty: 40 EACH | Refills: 0 | Status: SHIPPED | OUTPATIENT
Start: 2025-04-06 | End: 2025-04-13

## 2025-04-06 NOTE — ED PROVIDER NOTES
Encounter Date: 4/6/2025       History     Chief Complaint   Patient presents with    Sore Throat    Headache    Cough    Generalized Body Aches     Sore throat, productive cough, body aches, headache and fever since yesterday    Fever     54-year-old female with past medical history of hypertension presents to the emergency department with a chief complaint of sore throat, headache, cough, generalized body aches, and fever at all began yesterday.  Her symptoms have been constant and unchanged.  She did take Tylenol to relieve her symptoms, a provided mild relief.  Denies chest pain, shortness of breath, weakness, edema, palpitations, dysuria, numbness, tingling, blurry vision nausea, vomiting.    The history is provided by the patient and the spouse.     Review of patient's allergies indicates:  No Known Allergies  Past Medical History:   Diagnosis Date    Bilateral calf pain     cramping calf pain for months    Essential (primary) hypertension     Hypokalemia      Past Surgical History:   Procedure Laterality Date    BILATERAL TUBAL LIGATION  04/15/2001     Family History   Problem Relation Name Age of Onset    Brain cancer Mother          brain tumor    Stomach cancer Father      Stroke Sister      Heart failure Brother      Stroke Brother       Social History[1]  Review of Systems   Constitutional:  Positive for activity change, chills and fever. Negative for appetite change, diaphoresis, fatigue and unexpected weight change.   HENT:  Positive for congestion, postnasal drip, rhinorrhea and sore throat. Negative for dental problem, drooling, ear discharge, ear pain, facial swelling, hearing loss, mouth sores, nosebleeds, sinus pressure, sinus pain, sneezing, tinnitus, trouble swallowing and voice change.    Eyes: Negative.    Respiratory: Negative.  Negative for shortness of breath.    Cardiovascular: Negative.  Negative for chest pain.        Patient reports she took her blood pressure medication this morning    Gastrointestinal: Negative.  Negative for nausea.   Genitourinary: Negative.  Negative for dysuria.   Musculoskeletal: Negative.  Negative for back pain.        Generalized body aches   Skin: Negative.  Negative for rash.   Neurological:  Positive for headaches. Negative for dizziness, tremors, seizures, syncope, facial asymmetry, speech difficulty, weakness, light-headedness and numbness.   Hematological:  Does not bruise/bleed easily.   All other systems reviewed and are negative.      Physical Exam     Initial Vitals [04/06/25 0849]   BP Pulse Resp Temp SpO2   (!) 178/92 100 18 99 °F (37.2 °C) 100 %      MAP       --         Physical Exam    Nursing note and vitals reviewed.  Constitutional: Vital signs are normal. She appears well-developed and well-nourished. She is not diaphoretic. She is Obese . She is cooperative.  Non-toxic appearance. She does not have a sickly appearance. She does not appear ill. No distress.   Nontoxic appearance   HENT:   Head: Normocephalic and atraumatic.   Right Ear: Hearing, external ear and ear canal normal. No drainage, swelling or tenderness. A middle ear effusion is present. No decreased hearing is noted.   Left Ear: Hearing and external ear normal. No drainage, swelling or tenderness. No decreased hearing is noted.   Nose: Mucosal edema and rhinorrhea present. Right sinus exhibits no maxillary sinus tenderness and no frontal sinus tenderness. Left sinus exhibits no maxillary sinus tenderness and no frontal sinus tenderness. Mouth/Throat: Uvula is midline. Mucous membranes are dry. Oropharyngeal exudate and posterior oropharyngeal edema present.   Left ear canal occluded with cerumen, TM can not be visualized   Eyes: Conjunctivae and EOM are normal. Pupils are equal, round, and reactive to light. Right eye exhibits no discharge. Left eye exhibits no discharge.   Neck: Trachea normal and phonation normal. Neck supple. No thyromegaly present.   Normal range of  motion.  Cardiovascular:  Normal rate, regular rhythm, S1 normal, S2 normal, normal heart sounds, intact distal pulses and normal pulses.           Pulmonary/Chest: Effort normal and breath sounds normal. No accessory muscle usage or stridor. No tachypnea and no bradypnea. No respiratory distress. She has no decreased breath sounds. She has no wheezes. She has no rhonchi. She has no rales.   Normal effort, symmetrical chest rise and fall, no accessory muscle use. Bilateral clear breath sounds in all fields anterior and posterior.      Abdominal: Abdomen is soft. Bowel sounds are normal. She exhibits no distension. There is no abdominal tenderness.   Abdomen is soft, nontender, no peritoneal signs. Tolerating PO fluids.      No right CVA tenderness.  No left CVA tenderness. There is no rebound and no guarding.   Musculoskeletal:         General: No tenderness or edema. Normal range of motion.      Cervical back: Normal range of motion and neck supple.     Lymphadenopathy:     She has no cervical adenopathy.   Neurological: She is alert and oriented to person, place, and time. She has normal strength. She displays a negative Romberg sign. Coordination and gait normal. GCS score is 15. GCS eye subscore is 4. GCS verbal subscore is 5. GCS motor subscore is 6.   Skin: Skin is warm, dry and intact. Capillary refill takes less than 2 seconds. No rash noted. No erythema. No pallor.   Psychiatric: She has a normal mood and affect. Her behavior is normal. Judgment and thought content normal.         ED Course   Procedures  Labs Reviewed   COVID/FLU A&B PCR - Abnormal       Result Value    Influenza A PCR Not Detected      Influenza B PCR Not Detected      SARS-CoV-2 PCR Detected (*)     Narrative:     CT = 40.6    The Xpert Xpress SARS-CoV-2/FLU/RSV plus is a rapid, multiplexed real-time PCR test intended for the simultaneous qualitative detection and differentiation of SARS-CoV-2, Influenza A, Influenza B, and respiratory  syncytial virus (RSV) viral RNA in either nasopharyngeal swab or nasal swab specimens.         STREP GROUP A BY PCR - Normal    STREP A PCR ([facility]) Not Detected      Narrative:     The Xpert Xpress Strep A test is a rapid, qualitative in vitro diagnostic test for the detection of Streptococcus pyogenes (Group A ß-hemolytic Streptococcus, Strep A) in throat swab specimens from patients with signs and symptoms of pharyngitis.            Imaging Results    None          Medications - No data to display  Medical Decision Making  Strep Throat, viral pharyngitis, mononucleosis, HIV, GC, Herpangina, Oral candida, diphtheria, covid, influenza among others    Initial plan: covid, influenza, strep swabs  She took tylenol at home before arrival      The patient is a 54 y.o. female who presents to the [facility] Emergency Department with a chief complaint of URI symptoms x 5 days, symptoms are improving.  EPIC records were reviewed. Lab work was ordered and reviewed. Findings stated above covid positive. The patient medications to help her symptoms The patient was discharged home with a diagnosis of Covid and left ear cerumen impaction. The patient was advised to on URI discharge instructions, ED precautions discussed. It was recommended for the patient to follow up with primary care.  The patient was provided prescriptions for coricidan and debrox.  The patient's vital signs and condition remained stable while undergoing evaluation in the Emergency Department. The patient agreed with the plan for care.   The patient is nontoxic appearing, in no acute distress, with stable vital signs and resting comfortably. There is no objective evidence requiring urgent intervention or hospitalization. I provided counseling to the patient with regard to the medical condition, the treatment plan, specific conditions for return, and the importance of follow up. Detailed written and verbal instructions were provided to the patient and he/she expressed  a verbal understanding of the discharge instructions and overall management plan. Reiterated the importance of medication administration and safety, advised the patient to follow up with primary care provider in 3-5 days or sooner if needed. All questions and concerns were addressed before leaving the Emergency Department. The patient is stable for discharge.         Amount and/or Complexity of Data Reviewed  Labs:  Decision-making details documented in ED Course.    Risk  OTC drugs.              Attending Attestation:             Attending ED Notes:     I'm Dr. Jono Rhodes   I did not spend face to face time with this patient.    The patient was seen by NP/PA practicing independently here in the emergency department.  While I was available in the emergency department for consultation, I did not personally evaluate, examine, participate in the care of, or make recommendation(s) on the of the management of said patient.  My signature is an administrative requirement of the facility and should not be construed as my active or tacit approval of the evaluation or care provided. I, therefore, am unable to determine appropriateness of management without obtaining a personal history and exam.       ED Course as of 04/06/25 1046   Sun Apr 06, 2025   1015 COVID/FLU A&B PCR(!) [RQ]   1015 SARS-CoV2 (COVID-19) Qualitative PCR(!): Detected [RQ]      ED Course User Index  [RQ] Ro Lentz FNP                           Clinical Impression:  Final diagnoses:  [U07.1] COVID (Primary)  [H61.22] Impacted cerumen of left ear          ED Disposition Condition    Discharge Stable          ED Prescriptions       Medication Sig Dispense Start Date End Date Auth. Provider    dextromethorphan-guaiFENesin (CORICIDIN HBP CHEST SUN-COUGH)  mg Cap Take 1 tablet by mouth every 4 (four) hours as needed (cough and chest congesion). 40 each 4/6/2025 4/13/2025 Ro Lentz FNP    carbamide peroxide (DEBROX) 6.5 % otic solution  Place 5 drops into both ears as needed (ear cerumen). 15 mL 4/6/2025 4/13/2025 Ro Lentz FNP          Follow-up Information    None          Ro Lentz FNP  04/06/25 1046         [1]   Social History  Tobacco Use    Smoking status: Never     Passive exposure: Never    Smokeless tobacco: Never   Substance Use Topics    Alcohol use: Not Currently    Drug use: Never        Jono Rhodes MD  04/07/25 0712

## 2025-04-06 NOTE — Clinical Note
"Vicky DEUTSCHA" Randall was seen and treated in our emergency department on 4/6/2025.  She may return to work on 04/10/2025.       If you have any questions or concerns, please don't hesitate to call.      Ro Lentz, LYNN"

## 2025-08-07 ENCOUNTER — OFFICE VISIT (OUTPATIENT)
Dept: INTERNAL MEDICINE | Facility: CLINIC | Age: 55
End: 2025-08-07
Payer: MEDICAID

## 2025-08-07 ENCOUNTER — LAB VISIT (OUTPATIENT)
Dept: LAB | Facility: HOSPITAL | Age: 55
End: 2025-08-07
Payer: MEDICAID

## 2025-08-07 VITALS
TEMPERATURE: 99 F | HEART RATE: 84 BPM | BODY MASS INDEX: 39.47 KG/M2 | OXYGEN SATURATION: 98 % | SYSTOLIC BLOOD PRESSURE: 134 MMHG | RESPIRATION RATE: 20 BRPM | WEIGHT: 237.19 LBS | DIASTOLIC BLOOD PRESSURE: 84 MMHG

## 2025-08-07 DIAGNOSIS — I10 HYPERTENSION, UNSPECIFIED TYPE: ICD-10-CM

## 2025-08-07 DIAGNOSIS — R73.03 PREDIABETES: ICD-10-CM

## 2025-08-07 DIAGNOSIS — M62.838 MUSCLE SPASM: Primary | ICD-10-CM

## 2025-08-07 DIAGNOSIS — E55.9 HYPOVITAMINOSIS D: ICD-10-CM

## 2025-08-07 LAB
ALBUMIN SERPL-MCNC: 3.6 G/DL (ref 3.5–5)
ALBUMIN/GLOB SERPL: 0.9 RATIO (ref 1.1–2)
ALP SERPL-CCNC: 117 UNIT/L (ref 40–150)
ALT SERPL-CCNC: 16 UNIT/L (ref 0–55)
ANION GAP SERPL CALC-SCNC: 9 MEQ/L
AST SERPL-CCNC: 18 UNIT/L (ref 11–45)
BILIRUB SERPL-MCNC: 0.2 MG/DL
BUN SERPL-MCNC: 19 MG/DL (ref 9.8–20.1)
CALCIUM SERPL-MCNC: 9.3 MG/DL (ref 8.4–10.2)
CHLORIDE SERPL-SCNC: 108 MMOL/L (ref 98–107)
CO2 SERPL-SCNC: 26 MMOL/L (ref 22–29)
CREAT SERPL-MCNC: 1.07 MG/DL (ref 0.55–1.02)
CREAT/UREA NIT SERPL: 18
EST. AVERAGE GLUCOSE BLD GHB EST-MCNC: 116.9 MG/DL
GFR SERPLBLD CREATININE-BSD FMLA CKD-EPI: >60 ML/MIN/1.73/M2
GLOBULIN SER-MCNC: 4.2 GM/DL (ref 2.4–3.5)
GLUCOSE SERPL-MCNC: 82 MG/DL (ref 74–100)
HBA1C MFR BLD: 5.7 %
POTASSIUM SERPL-SCNC: 3.8 MMOL/L (ref 3.5–5.1)
PROT SERPL-MCNC: 7.8 GM/DL (ref 6.4–8.3)
SODIUM SERPL-SCNC: 143 MMOL/L (ref 136–145)

## 2025-08-07 PROCEDURE — 36415 COLL VENOUS BLD VENIPUNCTURE: CPT

## 2025-08-07 PROCEDURE — 83036 HEMOGLOBIN GLYCOSYLATED A1C: CPT

## 2025-08-07 PROCEDURE — 80053 COMPREHEN METABOLIC PANEL: CPT

## 2025-08-07 PROCEDURE — 99213 OFFICE O/P EST LOW 20 MIN: CPT | Mod: PBBFAC

## 2025-08-07 RX ORDER — TIZANIDINE 4 MG/1
4 TABLET ORAL EVERY 6 HOURS PRN
Status: CANCELLED | OUTPATIENT
Start: 2025-08-07 | End: 2025-08-17

## 2025-08-07 RX ORDER — NIFEDIPINE 30 MG/1
30 TABLET, EXTENDED RELEASE ORAL DAILY
Qty: 30 TABLET | Refills: 11 | Status: SHIPPED | OUTPATIENT
Start: 2025-08-07 | End: 2026-08-07

## 2025-08-07 RX ORDER — TRIAMTERENE CAPSULES 50 MG/1
50 CAPSULE ORAL DAILY
Qty: 30 CAPSULE | Refills: 11 | Status: SHIPPED | OUTPATIENT
Start: 2025-08-07 | End: 2026-08-07

## 2025-08-07 RX ORDER — DICLOFENAC SODIUM 50 MG/1
50 TABLET, DELAYED RELEASE ORAL 2 TIMES DAILY PRN
Qty: 30 TABLET | Refills: 0 | Status: SHIPPED | OUTPATIENT
Start: 2025-08-07

## 2025-08-07 NOTE — PROGRESS NOTES
[facility] Internal Medicine Clinic Note  [facility]  [place]     CC: follow up appointment    HISTORY:  Vicky Pereira is a 55 y.o. female with PMHx of Bilateral calf pain, Essential (primary) hypertension, and Hypokalemia. Patient presented today for follow up appointment. Pt was seen in the ED on 04/06/2025 for sore throat, headache, cough and malaise and diagnosed with COVID. Today, pt requests refill for voltaren for muscle spasms. Otherwise pt has no other complaints.     BP at home is 135/85-90. Recheck is 134/84. Pt also endorsees headaches morning and night. Has side effect to other BP meds. Pt is amendable to trying nifedipine. She is due for pneumococcal vaccine but wants to think about getting them first. Will attempt to offer again next visit. Pt understands care plan.       Patient Care Team:  Stanley Fan MD as PCP - General (Internal Medicine)    PMHx:   Problem List[1]  HTN (hypertension)  Muscle spasm   Tear of anal skin  Hypovitaminosis D  Prediabetes  hypokalemia        Surgical Hx:  Past Surgical History:   Procedure Laterality Date    BILATERAL TUBAL LIGATION  04/15/2001         Family Hx:  Family History   Problem Relation Name Age of Onset    Brain cancer Mother          brain tumor    Stomach cancer Father      Stroke Sister      Heart failure Brother      Stroke Brother           Social Hx:   Alcohol: none  Drugs: none  Tobacco: none  Education:   Work: pre-  Living situation (home/apartment e/homeless etc).: home  Family/social support: kids  Sexual Hx:     Allergies:   Review of patient's allergies indicates:  No Known Allergies      MEDICATIONS:  Current Outpatient Medications   Medication Instructions    diclofenac (VOLTAREN) 50 mg, Oral, 2 times daily PRN    NIFEdipine (PROCARDIA-XL) 30 mg, Oral, Daily    triamterene (DYRENIUM) 50 mg, Oral, Daily    vitamin D (VITAMIN D3) 1,000 Units, Oral, Daily      PHYSICAL EXAM:    Vital Signs:  /84  (BP Location: Left arm, Patient Position: Sitting)   Pulse 84   Temp 98.8 °F (37.1 °C) (Oral)   Resp 20   Wt 107.6 kg (237 lb 3.2 oz)   LMP 04/18/2022   SpO2 98%   BMI 39.47 kg/m²     Physical Exam    Constitutional: Appears stated age, resting comfortably, in no acute distress   HEENT: Nomocephalic, Atraumatic, conjunctiva normal, No scleral icterus, EOMI, PERRLA   Neck: Supple, no thyromegaly   CV: RRR, no murmurs   Resp: CTAB. No wheezes or crackles. Not in respiratory distress. On RA  GI: Soft, non-distended, non-tender   : No partida in place   Skin: Warm, dry, intact   Extremities: No edema. 2+ RA, 2+ DP,  2+ PT   Neurologic: Alert and oriented x3. CN 2-12 intact   Lymphatics: No cervical, clavicular, axillary adenopathy     Labs:  Labs reviewed from 02/2025. In summary, urine electrolyte, BMP, vit D level is unrevealing. Labs today: Cr 1.07. Alc 5.7    Imaging:  No recent imaging    Assessment & Plan:  Hypertension  Home BP runs 136/85-90. BP recheck in clinic is 136/84. Patient stated that she was previously on lisinopril which gave her a terrible headache. Discontinued Amlodipine due to lower extremity swelling; discontinued HCTZ due to hypokalemia; discontinued losartan   - continue Triamterene due to hypokalemia (d/c)  - start nifedipine 30mg    Hypokalemia (resolved)  Possibly due to previous diuretic prescriptions. K+ today is 3.8    CKD stage 1  No longer taking meloxicam chronically. Advised to avoid NSAIDs. Cr today 1.07  -Continue HTN regimen  -Will monitor    Prediabetes  POCT A1c was 6.0, A1c today 5.7. Pt Reports drinking soft drinks at least one per day. Pt states she is trying to walk around but not consistent.   -Advised patient on cutting down sugary drinks like sodas and juices  -Advised patient to try to exercise 30 minutes per day   -will continue to monitor.     Hypovitaminosis D  02/2025 vit D 34. Patient states that she has been taking her medication and is doing well.    -Continue cholecalciferol daily    Health Maintenance  Vaccinations  Immunization History   Administered Date(s) Administered    COVID-19, MRNA, LN-S, PF (Pfizer) (Gray Cap) 02/22/2022    COVID-19, MRNA, LN-S, PF (Pfizer) (Purple Cap) 03/17/2021, 04/06/2021    Influenza (FLUBLOK) - Quadrivalent - Recombinant - PF *Preferred* (egg allergy) 10/01/2020    Influenza - Quadrivalent - MDCK - PF 09/18/2019    Influenza - Quadrivalent - PF *Preferred* (6 months and older) 11/07/2022    Influenza - Trivalent - Fluarix, Flulaval, Fluzone, Afluria - PF 10/13/2021    Tdap 08/20/2018    Zoster Recombinant 10/17/2020, 12/17/2020      COVID: UTD, noted above  Influenza: N/A  Pneumococcal: Due, patient declines  Tetantus: UTD, noted above  Zoster: UTD, noted above      Screenings  Breast Ca/Family Hx of BRCA related Ca: UTD, last performed 2025.  Cervical Ca: UTD, last performed 2022.  Prediabetes/Diabetes: Due, ordered.  Hep C: UTD, last performed 2021.      Follow up in about 6 months (around 2/7/2026). In addition to their scheduled follow up, the patient has also been instructed to follow up on as needed basis.     Future Appointments   Date Time Provider Department Center   10/14/2025  2:20 PM Cheryle Keller FNP [facility] GYN [place] [facility]   2/16/2026  7:30 AM Stanley Fan MD [facility] IM RES [place] [facility]          Stanley Fan  [employer] [facility]             [1]   Patient Active Problem List  Diagnosis    HTN (hypertension)    Tear of anal skin    Hypovitaminosis D    Prediabetes

## 2025-08-22 ENCOUNTER — TELEPHONE (OUTPATIENT)
Dept: HEPATOLOGY | Facility: HOSPITAL | Age: 55
End: 2025-08-22
Payer: MEDICAID

## 2025-08-25 DIAGNOSIS — M62.838 MUSCLE SPASM: ICD-10-CM

## 2025-08-27 RX ORDER — DICLOFENAC SODIUM 50 MG/1
50 TABLET, DELAYED RELEASE ORAL 2 TIMES DAILY PRN
Qty: 30 TABLET | Refills: 0 | OUTPATIENT
Start: 2025-08-27